# Patient Record
Sex: MALE | Race: WHITE | Employment: PART TIME | URBAN - METROPOLITAN AREA
[De-identification: names, ages, dates, MRNs, and addresses within clinical notes are randomized per-mention and may not be internally consistent; named-entity substitution may affect disease eponyms.]

---

## 2020-06-27 ENCOUNTER — HOSPITAL ENCOUNTER (EMERGENCY)
Facility: CLINIC | Age: 30
Discharge: STILL ADMITTED OR ADMINISTRATIVE | End: 2020-06-27
Attending: FAMILY MEDICINE | Admitting: FAMILY MEDICINE
Payer: OTHER GOVERNMENT

## 2020-06-27 ENCOUNTER — AMBULATORY - HEALTHEAST (OUTPATIENT)
Dept: BEHAVIORAL HEALTH | Facility: CLINIC | Age: 30
End: 2020-06-27

## 2020-06-27 VITALS
BODY MASS INDEX: 33.05 KG/M2 | WEIGHT: 243.7 LBS | HEART RATE: 63 BPM | TEMPERATURE: 97.7 F | OXYGEN SATURATION: 99 % | DIASTOLIC BLOOD PRESSURE: 74 MMHG | SYSTOLIC BLOOD PRESSURE: 138 MMHG | RESPIRATION RATE: 16 BRPM

## 2020-06-27 DIAGNOSIS — F32.A DEPRESSION, UNSPECIFIED DEPRESSION TYPE: ICD-10-CM

## 2020-06-27 DIAGNOSIS — R45.851 SUICIDAL IDEATION: ICD-10-CM

## 2020-06-27 LAB
AMPHETAMINES UR QL SCN: NEGATIVE
BARBITURATES UR QL: NEGATIVE
BENZODIAZ UR QL: NEGATIVE
CANNABINOIDS UR QL SCN: NEGATIVE
COCAINE UR QL: NEGATIVE
ETHANOL UR QL SCN: NEGATIVE
OPIATES UR QL SCN: NEGATIVE
SARS-COV-2 PCR COMMENT: NORMAL
SARS-COV-2 RNA SPEC QL NAA+PROBE: NEGATIVE
SARS-COV-2 RNA SPEC QL NAA+PROBE: NORMAL
SPECIMEN SOURCE: NORMAL
SPECIMEN SOURCE: NORMAL

## 2020-06-27 PROCEDURE — U0003 INFECTIOUS AGENT DETECTION BY NUCLEIC ACID (DNA OR RNA); SEVERE ACUTE RESPIRATORY SYNDROME CORONAVIRUS 2 (SARS-COV-2) (CORONAVIRUS DISEASE [COVID-19]), AMPLIFIED PROBE TECHNIQUE, MAKING USE OF HIGH THROUGHPUT TECHNOLOGIES AS DESCRIBED BY CMS-2020-01-R: HCPCS | Performed by: EMERGENCY MEDICINE

## 2020-06-27 PROCEDURE — 99285 EMERGENCY DEPT VISIT HI MDM: CPT | Mod: Z6 | Performed by: EMERGENCY MEDICINE

## 2020-06-27 PROCEDURE — C9803 HOPD COVID-19 SPEC COLLECT: HCPCS | Performed by: EMERGENCY MEDICINE

## 2020-06-27 PROCEDURE — 99285 EMERGENCY DEPT VISIT HI MDM: CPT | Mod: 25 | Performed by: EMERGENCY MEDICINE

## 2020-06-27 PROCEDURE — 90791 PSYCH DIAGNOSTIC EVALUATION: CPT

## 2020-06-27 PROCEDURE — 80320 DRUG SCREEN QUANTALCOHOLS: CPT | Performed by: EMERGENCY MEDICINE

## 2020-06-27 PROCEDURE — 80307 DRUG TEST PRSMV CHEM ANLYZR: CPT | Performed by: EMERGENCY MEDICINE

## 2020-06-27 RX ORDER — MULTIVITAMIN,THERAPEUTIC
1 TABLET ORAL DAILY
COMMUNITY

## 2020-06-27 SDOH — HEALTH STABILITY: MENTAL HEALTH: HOW OFTEN DO YOU HAVE A DRINK CONTAINING ALCOHOL?: NEVER
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
## 2020-06-27 NOTE — PHARMACY-ADMISSION MEDICATION HISTORY
Admission medication history interview status for the 6/27/2020 admission is complete. See Epic admission navigator for allergy information, pharmacy, prior to admission medications and immunization status.     Medication history interview sources:  Patient,    Changes made to PTA medication list (reason)  Added:   1.) Vitamin D PO  Deleted: N/A  Changed:   1.) Lysine PO. Changed to: Lysine PO: take 1 tablet by mouth daily.  2.) Multivatamin Adult PO. Changed to: multivitamin tablet: take 1 tablet by mouth daily.    Additional medication history information (including reliability of information, actions taken by pharmacist):  1.) Patient was a good historian; he knew his medication by name and when they were last taken.  2.) Patient states he does not take any prescription medications.  3.) Patient did not know the strength of any of his vitamins but he knows he takes 1 tablet of each daily.       Prior to Admission medications    Medication Sig Last Dose Taking? Auth Provider   LYSINE PO Take 1 tablet by mouth daily  6/27/2020 at AM Yes Reported, Patient   multivitamin, therapeutic (THERA-VIT) TABS tablet Take 1 tablet by mouth daily 6/27/2020 at AM Yes Unknown, Entered By History   vitamin B-Complex Take 1 tablet by mouth daily 6/27/2020 at AM Yes Reported, Patient   VITAMIN D PO Take 1 tablet by mouth daily 6/27/2020 at AM Yes Unknown, Entered By History         Medication history completed by: REID Shankar3 Pharmacy Intern

## 2020-06-27 NOTE — ED NOTES
I have performed an in person assessment of the patient. Based on this assessment the patient no longer requires a one on one attendant at this point in time.    Fab Pike MD  5:03 PM  June 27, 2020         Fab Pike MD  06/27/20 8585

## 2020-06-27 NOTE — ED NOTES
ED to Behavioral Floor Handoff    SITUATION  Kin Hickey is a 30 year old male who speaks English and lives in a home with others The patient arrived in the ED by private car from home with a complaint of Suicidal (Put a gun to his head 1 hour ago but didn't pull the trigger and went to talk with his friend and then came here. Has had SI before but no SA. Denies HI. )  .The patient's current symptoms started/worsened a few days ago and during this time the symptoms have increased.   In the ED, pt was diagnosed with   Final diagnoses:   Suicidal ideation   Depression, unspecified depression type        Initial vitals were: BP: (!) 148/73  Heart Rate: 86  Temp: 97.7  F (36.5  C)  Weight: 110.5 kg (243 lb 11.2 oz)  SpO2: 96 %   --------  Is the patient diabetic? No   If yes, last blood glucose? --     If yes, was this treated in the ED? --  --------  Is the patient inebriated (ETOH) No or Impaired on other substances? No  MSSA done? N/A  Last MSSA score: --    Were withdrawal symptoms treated? N/A  Does the patient have a seizure history? No. If yes, date of most recent seizure--  --------  Is the patient patient experiencing suicidal ideation? reports the following suicide factors: suicidal thoughts of shooting himself with a gun, held a gun to his head today    Homicidal ideation? denies current or recent homicidal ideation or behaviors.    Self-injurious behavior/urges? denies current or recent self injurious behavior or ideation.  ------  Was pt aggressive in the ED No  Was a code called No  Is the pt now cooperative? Yes  -------  Meds given in ED: Medications - No data to display   Family present during ED course? No  Family currently present? No    BACKGROUND  Does the patient have a cognitive impairment or developmental disability? No  Allergies: No Known Allergies.   Social demographics are   Social History     Socioeconomic History     Marital status: Single     Spouse name: None     Number of children:  None     Years of education: None     Highest education level: None   Occupational History     None   Social Needs     Financial resource strain: None     Food insecurity     Worry: None     Inability: None     Transportation needs     Medical: None     Non-medical: None   Tobacco Use     Smoking status: Never Smoker     Smokeless tobacco: Never Used   Substance and Sexual Activity     Alcohol use: Never     Frequency: Never     Drug use: Never     Sexual activity: Yes     Partners: Female   Lifestyle     Physical activity     Days per week: None     Minutes per session: None     Stress: None   Relationships     Social connections     Talks on phone: None     Gets together: None     Attends Scientologist service: None     Active member of club or organization: None     Attends meetings of clubs or organizations: None     Relationship status: None     Intimate partner violence     Fear of current or ex partner: None     Emotionally abused: None     Physically abused: None     Forced sexual activity: None   Other Topics Concern     None   Social History Narrative     None        ASSESSMENT  Labs results   Labs Ordered and Resulted from Time of ED Arrival Up to the Time of Departure from the ED   COVID-19 VIRUS (CORONAVIRUS) BY PCR   DRUG ABUSE SCREEN 6 CHEM DEP URINE ([facility])      Imaging Studies: No results found for this or any previous visit (from the past 24 hour(s)).   Most recent vital signs BP (!) 148/73   Temp 97.7  F (36.5  C)   Wt 110.5 kg (243 lb 11.2 oz)   SpO2 96%   BMI 33.05 kg/m     Abnormal labs/tests/findings requiring intervention:---   Pain control: pt had none  Nausea control: pt had none    RECOMMENDATION  Are any infection precautions needed (MRSA, VRE, etc.)? No If yes, what infection? --  ---  Does the patient have mobility issues? independently. If yes, what device does the pt use? ---  ---  Is patient on 72 hour hold or commitment? No If on 72 hour hold, have hold and rights been given to  patient? N/A  Are admitting orders written if after 10 p.m. ?N/A  Tasks needing to be completed:---     Jesica Nichols RN    [phone] [facility]   [phone] [facility]

## 2020-06-28 NOTE — ED NOTES
Patient transferred to [facility] via [facility] ambulance.   JOANNA Ochoa at receiving hospital updated on status.

## 2020-06-30 ASSESSMENT — ENCOUNTER SYMPTOMS
ABDOMINAL PAIN: 0
FEVER: 0
SHORTNESS OF BREATH: 0

## 2020-07-01 ENCOUNTER — COMMUNICATION - HEALTHEAST (OUTPATIENT)
Dept: FAMILY MEDICINE | Facility: CLINIC | Age: 30
End: 2020-07-01

## 2020-07-10 ENCOUNTER — OFFICE VISIT - HEALTHEAST (OUTPATIENT)
Dept: FAMILY MEDICINE | Facility: CLINIC | Age: 30
End: 2020-07-10

## 2020-07-10 DIAGNOSIS — F33.2 SEVERE RECURRENT MAJOR DEPRESSION WITHOUT PSYCHOTIC FEATURES (H): ICD-10-CM

## 2020-07-10 ASSESSMENT — ANXIETY QUESTIONNAIRES
5. BEING SO RESTLESS THAT IT IS HARD TO SIT STILL: NOT AT ALL
2. NOT BEING ABLE TO STOP OR CONTROL WORRYING: NOT AT ALL
1. FEELING NERVOUS, ANXIOUS, OR ON EDGE: NOT AT ALL
7. FEELING AFRAID AS IF SOMETHING AWFUL MIGHT HAPPEN: NOT AT ALL
6. BECOMING EASILY ANNOYED OR IRRITABLE: NOT AT ALL
3. WORRYING TOO MUCH ABOUT DIFFERENT THINGS: NOT AT ALL
4. TROUBLE RELAXING: SEVERAL DAYS
GAD7 TOTAL SCORE: 1

## 2020-07-10 ASSESSMENT — PATIENT HEALTH QUESTIONNAIRE - PHQ9: SUM OF ALL RESPONSES TO PHQ QUESTIONS 1-9: 6

## 2020-07-10 ASSESSMENT — MIFFLIN-ST. JEOR: SCORE: 2068.5

## 2020-07-20 ENCOUNTER — OFFICE VISIT - HEALTHEAST (OUTPATIENT)
Dept: BEHAVIORAL HEALTH | Facility: CLINIC | Age: 30
End: 2020-07-20

## 2020-07-20 ENCOUNTER — AMBULATORY - HEALTHEAST (OUTPATIENT)
Dept: BEHAVIORAL HEALTH | Facility: CLINIC | Age: 30
End: 2020-07-20

## 2020-07-20 DIAGNOSIS — F43.10 POSTTRAUMATIC STRESS DISORDER: ICD-10-CM

## 2020-07-20 DIAGNOSIS — F33.2 SEVERE RECURRENT MAJOR DEPRESSION WITHOUT PSYCHOTIC FEATURES (H): ICD-10-CM

## 2020-08-04 ENCOUNTER — OFFICE VISIT - HEALTHEAST (OUTPATIENT)
Dept: BEHAVIORAL HEALTH | Facility: CLINIC | Age: 30
End: 2020-08-04

## 2020-08-04 DIAGNOSIS — F43.23 ADJUSTMENT DISORDER WITH MIXED ANXIETY AND DEPRESSED MOOD: ICD-10-CM

## 2020-08-14 ENCOUNTER — OFFICE VISIT - HEALTHEAST (OUTPATIENT)
Dept: BEHAVIORAL HEALTH | Facility: CLINIC | Age: 30
End: 2020-08-14

## 2020-08-14 DIAGNOSIS — F43.23 ADJUSTMENT DISORDER WITH MIXED ANXIETY AND DEPRESSED MOOD: ICD-10-CM

## 2020-08-14 ASSESSMENT — ANXIETY QUESTIONNAIRES
7. FEELING AFRAID AS IF SOMETHING AWFUL MIGHT HAPPEN: NOT AT ALL
5. BEING SO RESTLESS THAT IT IS HARD TO SIT STILL: SEVERAL DAYS
2. NOT BEING ABLE TO STOP OR CONTROL WORRYING: SEVERAL DAYS
1. FEELING NERVOUS, ANXIOUS, OR ON EDGE: NOT AT ALL
6. BECOMING EASILY ANNOYED OR IRRITABLE: NEARLY EVERY DAY
3. WORRYING TOO MUCH ABOUT DIFFERENT THINGS: NOT AT ALL
GAD7 TOTAL SCORE: 5
IF YOU CHECKED OFF ANY PROBLEMS ON THIS QUESTIONNAIRE, HOW DIFFICULT HAVE THESE PROBLEMS MADE IT FOR YOU TO DO YOUR WORK, TAKE CARE OF THINGS AT HOME, OR GET ALONG WITH OTHER PEOPLE: NOT DIFFICULT AT ALL
4. TROUBLE RELAXING: NOT AT ALL

## 2020-08-14 ASSESSMENT — PATIENT HEALTH QUESTIONNAIRE - PHQ9: SUM OF ALL RESPONSES TO PHQ QUESTIONS 1-9: 9

## 2020-08-21 ENCOUNTER — OFFICE VISIT - HEALTHEAST (OUTPATIENT)
Dept: FAMILY MEDICINE | Facility: CLINIC | Age: 30
End: 2020-08-21

## 2020-08-21 ENCOUNTER — OFFICE VISIT - HEALTHEAST (OUTPATIENT)
Dept: BEHAVIORAL HEALTH | Facility: CLINIC | Age: 30
End: 2020-08-21

## 2020-08-21 DIAGNOSIS — F43.23 ADJUSTMENT DISORDER WITH MIXED ANXIETY AND DEPRESSED MOOD: ICD-10-CM

## 2020-08-21 DIAGNOSIS — F32.89 OTHER DEPRESSION: ICD-10-CM

## 2020-08-21 ASSESSMENT — ANXIETY QUESTIONNAIRES
1. FEELING NERVOUS, ANXIOUS, OR ON EDGE: NOT AT ALL
4. TROUBLE RELAXING: SEVERAL DAYS
3. WORRYING TOO MUCH ABOUT DIFFERENT THINGS: NOT AT ALL
7. FEELING AFRAID AS IF SOMETHING AWFUL MIGHT HAPPEN: NOT AT ALL
6. BECOMING EASILY ANNOYED OR IRRITABLE: NOT AT ALL
5. BEING SO RESTLESS THAT IT IS HARD TO SIT STILL: NOT AT ALL
GAD7 TOTAL SCORE: 1
2. NOT BEING ABLE TO STOP OR CONTROL WORRYING: NOT AT ALL

## 2020-08-21 ASSESSMENT — PATIENT HEALTH QUESTIONNAIRE - PHQ9: SUM OF ALL RESPONSES TO PHQ QUESTIONS 1-9: 3

## 2020-08-21 ASSESSMENT — MIFFLIN-ST. JEOR: SCORE: 2082.56

## 2020-08-28 ENCOUNTER — AMBULATORY - HEALTHEAST (OUTPATIENT)
Dept: BEHAVIORAL HEALTH | Facility: CLINIC | Age: 30
End: 2020-08-28

## 2020-08-28 ENCOUNTER — OFFICE VISIT - HEALTHEAST (OUTPATIENT)
Dept: BEHAVIORAL HEALTH | Facility: CLINIC | Age: 30
End: 2020-08-28

## 2020-08-28 DIAGNOSIS — F43.23 ADJUSTMENT DISORDER WITH MIXED ANXIETY AND DEPRESSED MOOD: ICD-10-CM

## 2020-09-04 ENCOUNTER — OFFICE VISIT - HEALTHEAST (OUTPATIENT)
Dept: BEHAVIORAL HEALTH | Facility: CLINIC | Age: 30
End: 2020-09-04

## 2020-09-04 DIAGNOSIS — F43.23 ADJUSTMENT DISORDER WITH MIXED ANXIETY AND DEPRESSED MOOD: ICD-10-CM

## 2020-09-25 ENCOUNTER — OFFICE VISIT - HEALTHEAST (OUTPATIENT)
Dept: BEHAVIORAL HEALTH | Facility: CLINIC | Age: 30
End: 2020-09-25

## 2020-09-25 DIAGNOSIS — F43.23 ADJUSTMENT DISORDER WITH MIXED ANXIETY AND DEPRESSED MOOD: ICD-10-CM

## 2020-10-06 ENCOUNTER — OFFICE VISIT - HEALTHEAST (OUTPATIENT)
Dept: BEHAVIORAL HEALTH | Facility: CLINIC | Age: 30
End: 2020-10-06

## 2020-10-06 DIAGNOSIS — F43.23 ADJUSTMENT DISORDER WITH MIXED ANXIETY AND DEPRESSED MOOD: ICD-10-CM

## 2020-10-28 ENCOUNTER — OFFICE VISIT - HEALTHEAST (OUTPATIENT)
Dept: BEHAVIORAL HEALTH | Facility: CLINIC | Age: 30
End: 2020-10-28

## 2020-10-28 DIAGNOSIS — F43.23 ADJUSTMENT DISORDER WITH MIXED ANXIETY AND DEPRESSED MOOD: ICD-10-CM

## 2020-10-28 ASSESSMENT — ANXIETY QUESTIONNAIRES
6. BECOMING EASILY ANNOYED OR IRRITABLE: NOT AT ALL
5. BEING SO RESTLESS THAT IT IS HARD TO SIT STILL: NOT AT ALL
4. TROUBLE RELAXING: NOT AT ALL
3. WORRYING TOO MUCH ABOUT DIFFERENT THINGS: SEVERAL DAYS
7. FEELING AFRAID AS IF SOMETHING AWFUL MIGHT HAPPEN: SEVERAL DAYS
2. NOT BEING ABLE TO STOP OR CONTROL WORRYING: SEVERAL DAYS
GAD7 TOTAL SCORE: 3
1. FEELING NERVOUS, ANXIOUS, OR ON EDGE: NOT AT ALL
IF YOU CHECKED OFF ANY PROBLEMS ON THIS QUESTIONNAIRE, HOW DIFFICULT HAVE THESE PROBLEMS MADE IT FOR YOU TO DO YOUR WORK, TAKE CARE OF THINGS AT HOME, OR GET ALONG WITH OTHER PEOPLE: NOT DIFFICULT AT ALL

## 2020-10-28 ASSESSMENT — PATIENT HEALTH QUESTIONNAIRE - PHQ9: SUM OF ALL RESPONSES TO PHQ QUESTIONS 1-9: 13

## 2020-11-06 ENCOUNTER — OFFICE VISIT - HEALTHEAST (OUTPATIENT)
Dept: BEHAVIORAL HEALTH | Facility: CLINIC | Age: 30
End: 2020-11-06

## 2020-11-06 DIAGNOSIS — F43.23 ADJUSTMENT DISORDER WITH MIXED ANXIETY AND DEPRESSED MOOD: ICD-10-CM

## 2020-11-30 ENCOUNTER — OFFICE VISIT - HEALTHEAST (OUTPATIENT)
Dept: BEHAVIORAL HEALTH | Facility: CLINIC | Age: 30
End: 2020-11-30

## 2020-11-30 ENCOUNTER — AMBULATORY - HEALTHEAST (OUTPATIENT)
Dept: BEHAVIORAL HEALTH | Facility: CLINIC | Age: 30
End: 2020-11-30

## 2020-11-30 DIAGNOSIS — F43.23 ADJUSTMENT DISORDER WITH MIXED ANXIETY AND DEPRESSED MOOD: ICD-10-CM

## 2020-11-30 ASSESSMENT — ANXIETY QUESTIONNAIRES
GAD7 TOTAL SCORE: 0
2. NOT BEING ABLE TO STOP OR CONTROL WORRYING: NOT AT ALL
6. BECOMING EASILY ANNOYED OR IRRITABLE: NOT AT ALL
4. TROUBLE RELAXING: NOT AT ALL
3. WORRYING TOO MUCH ABOUT DIFFERENT THINGS: NOT AT ALL
7. FEELING AFRAID AS IF SOMETHING AWFUL MIGHT HAPPEN: NOT AT ALL
1. FEELING NERVOUS, ANXIOUS, OR ON EDGE: NOT AT ALL
5. BEING SO RESTLESS THAT IT IS HARD TO SIT STILL: NOT AT ALL

## 2020-11-30 ASSESSMENT — PATIENT HEALTH QUESTIONNAIRE - PHQ9: SUM OF ALL RESPONSES TO PHQ QUESTIONS 1-9: 5

## 2020-12-07 ENCOUNTER — OFFICE VISIT - HEALTHEAST (OUTPATIENT)
Dept: BEHAVIORAL HEALTH | Facility: CLINIC | Age: 30
End: 2020-12-07

## 2020-12-07 DIAGNOSIS — F43.23 ADJUSTMENT DISORDER WITH MIXED ANXIETY AND DEPRESSED MOOD: ICD-10-CM

## 2020-12-21 ENCOUNTER — OFFICE VISIT - HEALTHEAST (OUTPATIENT)
Dept: BEHAVIORAL HEALTH | Facility: CLINIC | Age: 30
End: 2020-12-21

## 2020-12-21 DIAGNOSIS — F43.23 ADJUSTMENT DISORDER WITH MIXED ANXIETY AND DEPRESSED MOOD: ICD-10-CM

## 2021-01-11 ENCOUNTER — OFFICE VISIT - HEALTHEAST (OUTPATIENT)
Dept: BEHAVIORAL HEALTH | Facility: CLINIC | Age: 31
End: 2021-01-11

## 2021-01-11 DIAGNOSIS — F43.23 ADJUSTMENT DISORDER WITH MIXED ANXIETY AND DEPRESSED MOOD: ICD-10-CM

## 2021-02-03 ENCOUNTER — OFFICE VISIT - HEALTHEAST (OUTPATIENT)
Dept: BEHAVIORAL HEALTH | Facility: CLINIC | Age: 31
End: 2021-02-03

## 2021-02-03 DIAGNOSIS — F43.23 ADJUSTMENT DISORDER WITH MIXED ANXIETY AND DEPRESSED MOOD: ICD-10-CM

## 2021-02-23 ENCOUNTER — OFFICE VISIT - HEALTHEAST (OUTPATIENT)
Dept: BEHAVIORAL HEALTH | Facility: CLINIC | Age: 31
End: 2021-02-23

## 2021-02-23 DIAGNOSIS — F43.23 ADJUSTMENT DISORDER WITH MIXED ANXIETY AND DEPRESSED MOOD: ICD-10-CM

## 2021-04-06 ENCOUNTER — OFFICE VISIT - HEALTHEAST (OUTPATIENT)
Dept: BEHAVIORAL HEALTH | Facility: CLINIC | Age: 31
End: 2021-04-06

## 2021-05-26 ASSESSMENT — PATIENT HEALTH QUESTIONNAIRE - PHQ9
SUM OF ALL RESPONSES TO PHQ QUESTIONS 1-9: 9
SUM OF ALL RESPONSES TO PHQ QUESTIONS 1-9: 3

## 2021-05-27 ASSESSMENT — PATIENT HEALTH QUESTIONNAIRE - PHQ9
SUM OF ALL RESPONSES TO PHQ QUESTIONS 1-9: 5
SUM OF ALL RESPONSES TO PHQ QUESTIONS 1-9: 6
SUM OF ALL RESPONSES TO PHQ QUESTIONS 1-9: 13

## 2021-05-28 ASSESSMENT — ANXIETY QUESTIONNAIRES
GAD7 TOTAL SCORE: 0
GAD7 TOTAL SCORE: 1
GAD7 TOTAL SCORE: 3
GAD7 TOTAL SCORE: 1
GAD7 TOTAL SCORE: 5

## 2021-06-04 VITALS
HEIGHT: 71 IN | WEIGHT: 241.9 LBS | BODY MASS INDEX: 33.86 KG/M2 | HEART RATE: 68 BPM | SYSTOLIC BLOOD PRESSURE: 120 MMHG | RESPIRATION RATE: 16 BRPM | DIASTOLIC BLOOD PRESSURE: 74 MMHG

## 2021-06-04 VITALS
TEMPERATURE: 98.5 F | DIASTOLIC BLOOD PRESSURE: 74 MMHG | BODY MASS INDEX: 34.3 KG/M2 | HEART RATE: 62 BPM | HEIGHT: 71 IN | OXYGEN SATURATION: 98 % | SYSTOLIC BLOOD PRESSURE: 108 MMHG | WEIGHT: 245 LBS | RESPIRATION RATE: 18 BRPM

## 2021-06-09 NOTE — PROGRESS NOTES
Hospital Follow-up Visit:    Assessment/Plan:     1. Severe recurrent major depression without psychotic features       - buPROPion (WELLBUTRIN XL) 150 MG 24 hr tablet; Take 1 tablet (150 mg total) by mouth daily.  Dispense: 30 tablet; Refill: 3  - AMB REFERRAL TO MENTAL HEALTH AND ADDICTION  - Adult (18+); Assessment and Testing, Outpatient Treatment; General Psychological Testing; [facility]- Personality Testing ONLY [phone]; We will contact you to sche...   Follow up in 4 weeks for recheck.   ER if symptoms worsen during treatment. Make a verbal contract with a family member today to take you to the ER if you develop any suicidal thoughts and check in with this person daily about your symptoms.         Subjective:     Kin Hickey is a 30 y.o. male who presents for a hospital discharge follow up. He was seen after hospitalization for suicidal ideation. He voluntarily went to the ER and was admitted due to holding a gun to his head. He has surrendered his gun. He has started Wellbutrin 150 mg ER. He feels that this has helped since his discharge. He has not started psychiatric counseling. He has a history of depression since childhood and his father had Schizophrenia. He is reluctant to go on any medication in high dose due to seeing how these medications affected his father. He states that since starting Wellbutrin he has had a small rash on his arms, this completely resolved after 1 week and he denies shortness of breath, wheezing, dysphagia, throat swelling.   ROS: Patient denies fever, chills, sweats, fainting, fatigue, weight change, dizziness, sleep problems, chest pain, palpitations, shortness of breath, wheezing, cough,  sore throat, changes in hearing, ear pain,tinnitus,  disphagia, sore throat, globus, changes in vision, eye pain eye redness, acid reflux, nausea, vomiting, diarrhea, constipation, black or bloody stools,  Dysuria, frequency, urinary incontinence,  "nocturia, hematuria, back pain,joint pain, bone pain, muscle cramps,edema, weakness, numbness, tingling of extremities, rash, itching, skin changes, swollen lymph nodes, thirst, increased urination, breast lumps, breast pain, nipple discharge, memory difficulties, anxiety, mood swings, (female)vaginal discharge, dyspareunia, menorrhagia, pelvic pain, sexual dysfunction,   (male) testicular lumps, erectile dysfunction.      Hospital/Nursing Home/IP Rehab Facility: [facility]  Date of Admission: 6/27/20  Date of Discharge:7/1/20  Reason(s) for Admission: Suicidal depression            Do you have any problems taking your medication regularly?  None       Have you had any changes in your medication since discharge? None       Have you had any difficulty following your discharge or treatment plan?  No    Summary of hospitalization:  Hospital discharge summary reviewed  Diagnostic Tests/Treatments reviewed.  Follow up needed: None  Other Healthcare Providers Involved in Patient's Care: Patient Care Team:  Provider, No Primary Care as PCP - General      Update since discharge: {improved   Information from family, SNF, care coordination: Psychiatric referral     Post Discharge Medication Reconciliation: discharge medications reconciled, continue medications without change  Plan of care communicated with: patient    Objective:     Vitals:    07/10/20 1553   BP: 120/74   Pulse: 68   Resp: 16   Weight: (!) 241 lb 14.4 oz (109.7 kg)   Height: 5' 11.26\" (1.81 m)         Physical Exam:    HEENT: Sclera white, nares patent, MMM, no edema, no rashes, no jaw claudication.  Lungs: Clear to auscultation. No retractions, no increased work of respiration, equal chest rise.   Heart: Regular rate and rhythm, no murmurs, clicks,   Gallops.  Extremities: Upper body tremor.  Mood: Good eye contact, smiles easily, full affect, no pressured speech, no psychomotor agitation, no suicidal or homicidal ideations.  Little interest or " pleasure in doing things: Several days  Feeling down, depressed, or hopeless: Nearly every day  Trouble falling or staying asleep, or sleeping too much: Not at all  Feeling tired or having little energy: Not at all  Poor appetite or overeating: Several days  Feeling bad about yourself - or that you are a failure or have let yourself or your family down: Several days  Trouble concentrating on things, such as reading the newspaper or watching television: Not at all  Moving or speaking so slowly that other people could have noticed. Or the opposite - being so fidgety or restless that you have been moving around a lot more than usual: Not at all  Thoughts that you would be better off dead, or of hurting yourself in some way: Not at all  PHQ-9 Total Score: 6  Skin: no rashes, erythema, edema.    Coding guidelines for this visit:  Type of Medical   Decision Making Face-to-Face Visit       within 7 Days of discharge Face-to-Face Visit        within 14 days of discharge   Moderate Complexity [id number]   High Complexity [id number]       Electronically signed by Fidelina Noguera PA-C 07/10/20 4:22 PM
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
## 2021-06-09 NOTE — TELEPHONE ENCOUNTER
New Appointment Needed  What is the reason for the visit:    Inpatient/ED Follow Up Appt Request  At what hospital or facility were you seen?: [facility]  What is the reason you were seen?: Mental health   What date were you admitted?: date: 6/27/20  What date were you discharged?: date: 7/1/20  What was the recommended timeframe for your follow up appointment?: 7 days   Provider Preference: Any available  How soon do you need to be seen?: within a week   Waitlist offered?: No  Okay to leave a detailed message:  Yes

## 2021-06-10 NOTE — PROGRESS NOTES
HPI:  Kin Hickey is a 30 y.o. male who is seen for   Chief Complaint   Patient presents with     Follow-up     DEPRESSION ANXIETY AND STRESS    Kin Hickey follows up for derpression and anxiety. He states he could not tollerate Wellbutrin due to itching and rash and had to discontinue it . He would like to continue on counseling only and has been attending his sessions regularly. He is open to trying trazodone again since he had it before. He denies any thoughts of harm.   Little interest or pleasure in doing things: Not at all  Feeling down, depressed, or hopeless: More than half the days  Trouble falling or staying asleep, or sleeping too much: Not at all  Feeling tired or having little energy: Not at all  Poor appetite or overeating: Not at all  Feeling bad about yourself - or that you are a failure or have let yourself or your family down: Several days  Trouble concentrating on things, such as reading the newspaper or watching television: Not at all  Moving or speaking so slowly that other people could have noticed. Or the opposite - being so fidgety or restless that you have been moving around a lot more than usual: Not at all  Thoughts that you would be better off dead, or of hurting yourself in some way: Not at all  PHQ-9 Total Score: 3  Feeling nervous, anxious, or on edge: 0 (2020 11:00 AM)  Not being able to stop or control worryin (2020 11:00 AM)  Worrying too much about different things: 0 (2020 11:00 AM)  Trouble relaxin (2020 11:00 AM)  Being so restless that it's hard to sit still: 0 (2020 11:00 AM)  Becoming easily annoyed or irritable: 0 (2020 11:00 AM)  Feeling afraid as if something awful might happen: 0 (2020 11:00 AM)  ARMANDO 7 Total Score: 1 (2020 11:00 AM)        No results found for: HGBA1C  No results found for: GLUF, MICROALBUR, LDLCALC, CREATININE  Patient Active Problem List   Diagnosis     Recurrent Sore Throat     Acute Tonsillitis      Suicidal ideation     Severe recurrent major depression without psychotic features (H)     Posttraumatic stress disorder     Adjustment disorder with mixed anxiety and depressed mood     Family History   Problem Relation Age of Onset     Schizophrenia Father      Social History     Socioeconomic History     Marital status: Single     Spouse name: None     Number of children: None     Years of education: None     Highest education level: None   Occupational History     None   Social Needs     Financial resource strain: None     Food insecurity     Worry: None     Inability: None     Transportation needs     Medical: None     Non-medical: None   Tobacco Use     Smoking status: Never Smoker     Smokeless tobacco: Never Used     Tobacco comment: None   Substance and Sexual Activity     Alcohol use: Never     Frequency: Never     Drug use: Never     Comment: None     Sexual activity: Not Currently     Partners: Female     Birth control/protection: None   Lifestyle     Physical activity     Days per week: None     Minutes per session: None     Stress: None   Relationships     Social connections     Talks on phone: None     Gets together: None     Attends Jewish service: None     Active member of club or organization: None     Attends meetings of clubs or organizations: None     Relationship status: None     Intimate partner violence     Fear of current or ex partner: None     Emotionally abused: None     Physically abused: None     Forced sexual activity: None   Other Topics Concern     None   Social History Narrative     None     Past Surgical History:   Procedure Laterality Date     COSMETIC SURGERY  2008    Mastectomy     MASTECTOMY       Current Outpatient Medications on File Prior to Visit   Medication Sig Dispense Refill     buPROPion (WELLBUTRIN XL) 150 MG 24 hr tablet Take 1 tablet (150 mg total) by mouth daily. 30 tablet 3     cholecalciferol, vitamin D3, 1,000 unit (25 mcg) tablet Take 1,000 Units by  mouth daily.       cyanocobalamin, vitamin B-12, (CYANOCOBALAMIN) 1,000 mcg tablet Take 1,000 mcg by mouth daily.       lysine 500 mg Tab Take 500 mg by mouth daily.        melatonin 3 mg Tab tablet Take 1 tablet (3 mg total) by mouth at bedtime as needed.  0     multivitamin therapeutic tablet Take 1 tablet by mouth daily.       No current facility-administered medications on file prior to visit.      No Known Allergies    I have reviewed the patient's medical history in detail and updated the computerized patient record.  OBJECTIVE:  Wt Readings from Last 3 Encounters:   08/21/20 (!) 245 lb (111.1 kg)   07/10/20 (!) 241 lb 14.4 oz (109.7 kg)   06/28/20 (!) 242 lb (109.8 kg)     Temp Readings from Last 3 Encounters:   08/21/20 98.5  F (36.9  C) (Oral)   07/01/20 98  F (36.7  C) (Oral)     BP Readings from Last 3 Encounters:   08/21/20 108/74   07/10/20 120/74   07/01/20 114/58     Pulse Readings from Last 3 Encounters:   08/21/20 62   07/10/20 68   07/01/20 68     Body mass index is 33.92 kg/m .     General: alert, oriented and in no distress.  HEENT: Sclera white, PERRLA, EOMFI nares patent, MMM   Neck: neck supple with normal range of motion of the mandible and no dysarthria.  Lungs: Respirations are not labored. Equal chest rise.  Mood: Affect full, good eye contact, no pressured speech, no psychomotor agitation, no tremor, no suicidal or homicidal ideations.      Labs:  No visits with results within 3 Month(s) from this visit.   Latest known visit with results is:   No results found for any previous visit.     ASSESMENT/PLAN:  1. Other depression  traZODone (DESYREL) 50 MG tablet   willing to try trazodone for rescue. ER if symptoms worsen during treatment. Make a verbal contract with a family member today to take you to the ER if you develop any suicidal thoughts and check in with this person daily about your symptoms.  Continue counseling.  Use My chart and phone or video visit for rechecks over the next 6  weeks.  Fidelina Noguera, MS, PA-C 08/21/20

## 2021-06-10 NOTE — PROGRESS NOTES
Mental Health Visit Note    Patient: Kin Hickey    : 1990 MRN: [id number]    2020    Start time: 1:40 PM    Stop Time: 2:   Session # 2  Session was 53+ minutes in length due to content of the session and therapist needing to build rapport with client.     Session Type: Patient is presenting for an Individual session.    Kin Hickey is a 30 y.o. male is being seen today for adjustment disorder, with mixed anxiety and depressed mood.   .     Telemedicine Visit: The patient's condition can be safely assessed and treated via synchronous audio and visual telemedicine encounter.      Reason for Telemedicine Visit: Services only offered telehealth due to public health crisis    Originating Site (Patient Location): Patient's home    Distant Site (Provider Location): Provider Remote Setting- Home Office    Consent:  The patient/guardian has verbally consented to: the potential risks and benefits of telemedicine (video visit) versus in person care; bill my insurance or make self-payment for services provided; and responsibility for payment of non-covered services.     Mode of Communication:  Video Conference via [employer]    As the provider I attest to compliance with applicable laws and regulations related to telemedicine.    Those present for this visit: patient and provider    Follow up in regards to ongoing symptom management of: adjustment disorder related symptoms in response to a recent incident.     New symptoms or complaints: None    Functional Impairment:   Personal: 3  Family: 2  Social: 3  Work: 3    Clinical assessment of mental status:   Kin Hickey presented on time.   He was oriented x3, open and cooperative, and dressed appropriately for this session and weather. His memory was Normal cognitive functioning .  His speech was  Within normal.  Language was typical and appropriate.  Concentration and focus is Within normal. Psychosis is not noted or reported. He reports his mood is  "Congruent w/content of speech, Anxious and Depressed.  Affect is congruent with speech and is Congruent w/content of speech, Anxious and Depressed.  Fund of knowledge is adequate. Insight is adequate for therapy.    Suicidal/Homicidal Ideation present: Patient denies suicidal and homicidal ideations/means or plans.     Patient's impression of their current status: Patient says that his mood has been up and down over the last week. Patient reports that the support group he has been attending has been helpful for him and has helped him put things in perspective      Therapist impression of patients current state: This 30 y.o. White or  male presents with: depression, anxiety and trauma related symptoms. The patient's impression of his current status appears accurate. The patient appears highly motivated to learn how to manage his symptoms and to address his past behavior using a restorative justice approach, as evident by his statements and reported actions. The patient continues to appear anxious and depressed, as evident by his statements and observable behavior. However, the patient appears to be actively trying to manage his symptoms in \"healthy way.\" The patient continues to demonstrate good insight and appears to be an appropriate psychotherapy candidate.     Assessment tools used today include: Most recent ARMANDO-7, PHQ-9, Can be found documented in Doc Flowsheets.       Type of psychotherapeutic technique provided: Client centered and Rapport building, Psycho-education, Modeling    Progress toward short term goals:Progress as expected, new patient, goals not yet established    Review of long term goals: Not done at today's visit . new patient, goals not yet established  Diagnosis:   1. Adjustment disorder with mixed anxiety and depressed mood     no change    Plan and Follow-up: Patient plans to review \"unhealthy thinking styles sheet\" and start gratitude journaling. Patient plans to continue taking the " medication as prescribed. Patient plans to follow up with therapy a week. Provider will review recent hospitalization records and complete DA next session.    Discharge Criteria/Planning: Patient will continue with follow-up until therapy can be discontinued without return of signs and symptoms.    I have reviewed the note as documented above.  This accurately captures the substance of my conversation with the patient.    As the provider I attest to compliance with applicable laws and regulations related to telemedicine.  Performed and documented by CASI Ross   8/4/2020Performed and documented by 8/4/2020
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
## 2021-06-10 NOTE — PROGRESS NOTES
Mental Health Visit Note    Patient: Kin Hickey    : 1990 MRN: [id number]    2020    Start time: 1:04 PM    Stop Time: 2:01 PM   Session # 1    Session Type: Patient is presenting for an Individual session.    Kin Hickey is a 30 y.o. male is being seen today for    Chief Complaint   Patient presents with     Depression     PTSD     MH Follow Up   .     Telemedicine Visit: The patient's condition can be safely assessed and treated via synchronous audio and visual telemedicine encounter.      Reason for Telemedicine Visit: Services only offered telehealth    Originating Site (Patient Location): Patient's other : mother's house    Distant Site (Provider Location): Provider Remote Setting- Home Office    Consent:  The patient/guardian has verbally consented to: the potential risks and benefits of telemedicine (video visit) versus in person care; bill my insurance or make self-payment for services provided; and responsibility for payment of non-covered services.     Mode of Communication:  Video Conference via [email or web address]    As the provider I attest to compliance with applicable laws and regulations related to telemedicine.    Those present for this visit: patient and provider    Follow up in regards to ongoing symptom management of depression.     New symptoms or complaints: None    Functional Impairment:   Personal: 3  Family: 3  Social: 4  Work: 3    Clinical assessment of mental status:   Kin Hickey presented on time.   He was oriented x3, open and cooperative, and dressed appropriately for this session and weather. His memory was Normal cognitive functioning .  His speech was  Within normal.  Language was typical and appropriate.  Concentration and focus is Within normal. Psychosis is not noted or reported. He reports his mood is Congruent w/content of speech, Anxious and Depressed.  Affect is congruent with speech and is Congruent w/content of speech, Anxious and Depressed.  Fund of knowledge  is adequate. Insight is adequate for therapy.    ASSESSMENT: Current Emotional / Mental Status (status of significant symptoms):              Patient reports the following current or recent suicidal ideation or behaviors: Patient was recently hospitalized for SI. Patient was discharged from hospital and is currently staying with his mother while he stabilizes. .              Patient denies current or recent homicidal ideation or behaviors.              Patient denies current or recent self injurious behavior or ideation.              Patient denies other safety concerns.              A safety and risk management plan has been developed including: Patient consented to co-develop a safety plan.  Safety and risk management plan was completed. Patient agreed to use the safety plan should any safety concerns arise.  A copy of the completed safety plan will be sent to patient electronically.                Attitude:                                   Cooperative  Interested Attentive              Orientation:                             x3              Speech                          Rate / Production:       Normal/ Responsive Normal                           Volume:                       Normal               Mood:                                      Anxious  Depressed  Normal              Thought Content:                    Clear               Thought Form:                        Coherent  Logical               Insight:                                     Good  and Intellectual Insight    Patient's impression of their current status: Patient reports that he has been very depressed in response to a recent incident that occurred. The patient reports that he was recently hospitalized at [facility] for SI. Patient reports that he believes it would be good for him to participate in therapy so that he can learn how to communicate his feelings and be better equipt to deal with his emotions. Patient reports that he has been  dealing with shame associated with past trauma and behavior.     Therapist impression of patients current state: This 30 y.o. White or  male presents with symptoms of PTSD and Depression. The patient's impression of his current status appears accurate. Patient exhibited excellent insight and appears motivated to address his: mental health, past trauma, and past behavior, as evident by his statements and reported actions.     Assessment tools used today include: C-SSRS (Lifetime/Recent) and can be found documented in Doc Flowsheets. PHQ-9, ARMANDO-7, WHODAS and CAGE will be completed during the next 2 sessions.       Type of psychotherapeutic technique provided: Client centered and Psychoeducation, Rapport building      Progress toward short term goals:New patient - 1st session      Review of long term goals: New patient - First session .   Diagnosis:   1. Posttraumatic stress disorder    2. Severe recurrent major depression without psychotic features (H)        Plan and Follow-up:  Patient plans to continue taking the medication as prescribed. Patient plans to follow up with therapy two weeks. Providerwill continue to build rapport with patient. Provider will complete DA by 3rd session.       Discharge Criteria/Planning: Patient will continue with follow-up until therapy can be discontinued without return of signs and symptoms.      I have reviewed the note as documented above.  This accurately captures the substance of my conversation with the patient.    As the provider I attest to compliance with applicable laws and regulations related to telemedicine.  Performed and documented by: CASI Ross     7/20/2020Performed and documented by 7/20/2020

## 2021-06-10 NOTE — PROGRESS NOTES
"Step 1: Warning signs / cues (Thoughts, images, mood, situation, behavior) that a crisis may be developing: please check all that apply and/or add your own    Thoughts:   [] \"I don't matter\"   [x] \"People would be better off without me\"  [] \"I'm a burden\"  [x] \"I can't do this anymore\"  [x] \"I just want this to end\"   [] \"Nothing makes it better\"  [] \"Other\"     Images:   [x] Obsessive thoughts of death or dying:   [x] Flashbacks   [] Visions of harm  [] Other     Thinking Processes:   [x] Ruminations (can't stop thinking about my problems):   [] Racing thoughts   [x] Intrusive thoughts (bothersome, unwanted thoughts that come out of nowhere):   [x] Highly critical and negative thoughts:   [] Disorganized thinking:   [] Paranoia:   [] Depersonalization  [] Other     Mood:  [x] Worsening depression  [x] Hopelessness  [] Helplessness  [x] Intense anger  [x] Intense worry  [] Agitation  [] Disinhibited (not caring about things or consequences)   [] Mood swings  [] Other     Behaviors:   [x] Isolating/withdrawing   [] Using drugs,   [] Using alcohol  [] Can't stop crying  [x] Impulsive  [] Reckless behaviors (acting without thinking)  [] Giving things away  [] Saying good-bye  [] Aggression  [] Not taking care of myself   [] Not taking care of my responsibilities  [] Sleeping too much  [] Not sleeping enough  [] Increasing frequency and duration of dissociation  [] Other     Situations:   [] Bullying  [x] Loss  [x] Public shame  [] Legal issues  [] Anniversary of   [] Changes in symptoms   [] Physical Pain   [] Relationship problems  [x] Trauma   [] Relapse of   [x] Financial stress   [] Medical condition / diagnosis   [] Other     Step 2: Coping strategies  Things I can do to take my mind off of my problems without contacting another person    Distress Tolerance Strategies   [x] Relaxation activities  [] Arts and crafts:   [] Play with my pet   [] Listen to positive and upbeat music   [] Sensory based " activities/self-soothe with five senses  [] Watch a funny movie  [] [place]  [] Read a book  [x] Change body temperature (ice pack/cold water)  [x] Paced breathing/progressive muscle relaxation  [x] Intense exercise for 2-3 minutes; repeat  [] Other     Physical Activities:     [x] Go for a walk  [x] Exercise  [] Yoga  [] Gardening  [] Meditation  [x] Deep breathing   [x] Stretching  []  Other     Focus on helpful thoughts:   List thoughts you can remind yourself of that will help you to be safe.    - I am taking accountability for my actions.     -There are people in my life who love me.       Step 3: People that provide distraction: Friends/Co-workers at the gym.     Name: Mom  Phone: Number in patient's phone    Name: Friends/Co-workers at the gym.   Phone: Numbers are in my phone    Name: Roomates  Phone: Numbers in phone    Social settings that provide distraction  [] Movie theater  [] Pet store/[employer] society  [] Zoo  [] Coffee shop  [] Park  [] Library  [] Volunteering  [] Community center  [x] Gym  [] Anabaptism  [] Support group (i.e. twelve-step)  [x] School and/or work  []  Other     Step 4: Remind myself of people that are important to me and worth living for.     Remind myself of things/pets/beliefs that are important to me and worth living for.    Step 5: When I am in crisis, I can ask these people to help me use my safety plan:    Name: Mom  Phone: Number in my phone    Name: Roomates  Phone:Numbers are in my phone.         Step 6: Making the environment safe:    [] Remove alcohol  [] Remove drugs  [x] Secure medications  [x] Dispose of old medications  [] Remove access to firearms  [x] Remove things I could use to hurt myself  [] Take alternate routes from my usual routine  []  Arrange transportation rather than drive myself  [x] Spend time with / be around others  [] Other     Step 7: Professionals or agencies I can contact during a crisis:    [x] [facility] Daytime Number:  [phone]  [x] Suicide Prevention Lifeline: 0-732-355-TALK (9422)  [x] Crisis Text Line Service (available 24 hours a day, 7 days a week):   [x] Text [place] to 502717   [x] Local Crisis Services   [x] Call 911 or go to my nearest emergency department.    I helped develop this safety plan and agree to use it when needed. I have been given a copy of this plan.    Client signature _________________________________________________________________    Today s date: 7/20/2020      Adapted from Safety Plan Template 2008 Moon Marquez and Blaze Kimble is reprinted with the express permission of the authors. No portion of the Safety Plan Template may be reproduced without the express, written permission. You can contact the authors at [email or web address] or [email or web address].

## 2021-06-10 NOTE — PATIENT INSTRUCTIONS - HE
"Safety Plan:  Step 1: Warning signs / cues (Thoughts, images, mood, situation, behavior) that a crisis may be developing: please check all that apply and/or add your own     Thoughts:   []? \"I don't matter\"   [x]? \"People would be better off without me\"  []? \"I'm a burden\"  [x]? \"I can't do this anymore\"  [x]? \"I just want this to end\"   []? \"Nothing makes it better\"  []? \"Other\"      Images:   [x]? Obsessive thoughts of death or dying:   [x]? Flashbacks   []? Visions of harm  []? Other      Thinking Processes:   [x]? Ruminations (can't stop thinking about my problems):   []? Racing thoughts   [x]? Intrusive thoughts (bothersome, unwanted thoughts that come out of nowhere):   [x]? Highly critical and negative thoughts:   []? Disorganized thinking:   []? Paranoia:   []? Depersonalization  []? Other      Mood:  [x]? Worsening depression  [x]? Hopelessness  []? Helplessness  [x]? Intense anger  [x]? Intense worry  []? Agitation  []? Disinhibited (not caring about things or consequences)   []? Mood swings  []? Other      Behaviors:   [x]? Isolating/withdrawing   []? Using drugs,   []? Using alcohol  []? Can't stop crying  [x]? Impulsive  []? Reckless behaviors (acting without thinking)  []? Giving things away  []? Saying good-bye  []? Aggression  []? Not taking care of myself   []? Not taking care of my responsibilities  []? Sleeping too much  []? Not sleeping enough  []? Increasing frequency and duration of dissociation  []? Other      Situations:   []? Bullying  [x]? Loss  [x]? Public shame  []? Legal issues  []? Anniversary of   []? Changes in symptoms   []? Physical Pain   []? Relationship problems  [x]? Trauma   []? Relapse of   [x]? Financial stress   []? Medical condition / diagnosis   []? Other      Step 2: Coping strategies  Things I can do to take my mind off of my problems without contacting another person     Distress Tolerance Strategies   [x]? Relaxation activities  []? Arts and crafts:   []? Play with my " pet   []? Listen to positive and upbeat music   []? Sensory based activities/self-soothe with five senses  []? Watch a funny movie  []? [place]  []? Read a book  [x]? Change body temperature (ice pack/cold water)  [x]? Paced breathing/progressive muscle relaxation  [x]? Intense exercise for 2-3 minutes; repeat  []? Other      Physical Activities:               [x]? Go for a walk  [x]? Exercise  []? Yoga  []? Gardening  []? Meditation  [x]? Deep breathing   [x]? Stretching  []?  Other      Focus on helpful thoughts:   List thoughts you can remind yourself of that will help you to be safe.     - I am taking accountability for my actions.      -There are people in my life who love me.         Step 3: People that provide distraction: Friends/Co-workers at the gym.      Name: Mom  Phone: Number in patient's phone     Name: Friends/Co-workers at the gym.   Phone: Numbers are in my phone     Name: Roomates  Phone: Numbers in phone     Social settings that provide distraction  []? Movie theater  []? Pet store/humane society  []? Zoo  []? Coffee shop  []? Park  []? Library  []? Volunteering  []? Community center  [x]? Gym  []? Anabaptist  []? Support group (i.e. twelve-step)  [x]? School and/or work  []?  Other      Step 4: Remind myself of people that are important to me and worth living for.                Remind myself of things/pets/beliefs that are important to me and worth living for.     Step 5: When I am in crisis, I can ask these people to help me use my safety plan:     Name: Mom  Phone: Number in my phone     Name: Jocelyne  Phone:Numbers are in my phone.            Step 6: Making the environment safe:     []? Remove alcohol  []? Remove drugs  [x]? Secure medications  [x]? Dispose of old medications  []? Remove access to firearms  [x]? Remove things I could use to hurt myself  []? Take alternate routes from my usual routine  []?  Arrange transportation rather than drive myself  [x]? Spend time with / be around  others  []? Other      Step 7: Professionals or agencies I can contact during a crisis:     [x]? [facility] Daytime Number: [phone]  [x]? Suicide Prevention Lifeline: 1-390-638-PAHU (5842)  [x]? Crisis Text Line Service (available 24 hours a day, 7 days a week):   [x]? Text [place] to 405104              [x]? Local Crisis Services              [x]? Call 911 or go to my nearest emergency department.     I helped develop this safety plan and agree to use it when needed. I have been given a copy of this plan.     Client signature _________________________________________________________________     Today s date: 7/20/2020

## 2021-06-11 NOTE — PROGRESS NOTES
Mental Health Visit Note    Patient: Kin Hickey    : 1990 MRN: [id number]    2020    Start time: 2:07 PM  Stop Time: 2:52 PM  Session #4    Session Type: Patient is presenting for an Individual session.    Kin Hickey is a 30 y.o. male is being seen today for depression and PTSD related symptoms.     Telemedicine Visit: The patient's condition can be safely assessed and treated via synchronous audio and visual telemedicine encounter.      Reason for Telemedicine Visit: Services only offered telehealth due to public health crisis      Originating Site (Patient Location): Patient's home    Distant Site (Provider Location): Provider Remote Setting- Home Office    Consent:  The patient/guardian has verbally consented to: the potential risks and benefits of telemedicine (video visit) versus in person care; bill my insurance or make self-payment for services provided; and responsibility for payment of non-covered services.     Mode of Communication:  Video Conference via [employer]    As the provider I attest to compliance with applicable laws and regulations related to telemedicine.    Those present for this visit: Provider and Patient    Follow up in regards to ongoing symptom management of depression and anxiety related symptoms.     New symptoms or complaints: None    Functional Impairment:   Personal: 4  Family: 2  Social: 4  Work: 2    Clinical assessment of mental status:   Kin Hickey presented on time.   He was oriented x3, open and cooperative, and dressed appropriately for this session and weather. His memory was Normal cognitive functioning .  His speech was  Within normal.  Language was typical and appropriate.  Concentration and focus is Within normal. Psychosis is not noted or reported. He reports his mood is Congruent w/content of speech, Anxious and Depressed.  Affect is congruent with speech and is Congruent w/content of speech, Anxious and Depressed.  Fund of knowledge is  adequate. Insight is adequate for therapy.    ASSESSMENT: Current Emotional / Mental Status (status of significant symptoms):              Patient denies current or recent suicidal ideation or behaviors.              Patient denies current or recent homicidal ideation or behaviors.              Patient denies current or recent self injurious behavior or ideation.              Patient denies other safety concerns.                A safety and risk management plan has been developed including: was developed at a previous session.                 Attitude:                                   Cooperative  Interested Attentive              Orientation:                             x3              Speech                          Rate / Production:       Normal/ Responsive Normal                           Volume:                       Normal               Mood:                                      Depressed  Normal              Thought Content:                    Clear               Thought Form:                        Coherent  Logical               Insight:                                     Good  and Intellectual Insight    Patient's impression of their current status: Patient reports that it has been a pretty good week. The patient reports that he feels about about a recent conversation he had. Patient also reports feeling frustrated about his current living situation. Patient reports that he has been trying to think positively.     Therapist impression of patients current state: This 30 y.o. male presents with depression related symptoms. The patient's impression of his current state appears accurate. The patient continues to appear depressed but appears more hopeful than he has in previous sessions, as evident by his statements, manner of speaking and body language. The patient exhibited good insight throughout the session and appeared receptive to the feedback and information that the therapist provided, as evident by  his statements and body language.     Assessment tools used today include: None completed this session.     Type of psychotherapeutic technique provided: Insight oriented, Client centered and Active listening, Validation, Normalization      Progress toward short term goals:Progress as expected, patient reports that he spoke to his roomates and reports that he has been continuing to exercise       Review of long term goals: Not done at today's visit . Will be established next visit.        Diagnosis: adjustment disorder, with mixed anxiety and depressed mood, improving      Plan and Follow-up: Patient plans to move back into his apartment. Patient plans to think of a meaningful way he can release and let go. Patient plans to continue exercising. Patient plans to continue taking the medication as prescribed. Patient plans to follow up with therapy a week.     Establish long-term goals next session.       Discharge Criteria/Planning: Patient will continue with follow-up until therapy can be discontinued without return of signs and symptoms.      I have reviewed the note as documented above.  This accurately captures the substance of my conversation with the patient.    As the provider I attest to compliance with applicable laws and regulations related to telemedicine.  Performed and documented by CASI Ross   8/28/2020Performed and documented by 8/28/2020

## 2021-06-11 NOTE — PROGRESS NOTES
"Outpatient Mental Health Treatment Plan    Name:  Kin Hickey  :  1990  MRN:  [id number]    Treatment Plan:  Initial Treatment Plan  Date Treatment Plan Initiated:  2020  Treatment Plan: Updated Treatment Plan R  Intake/initial treatment plan date: 2020  Benefit and risks and alternatives have been discussed: Yes    Plan:      ? Depression    Goal:  Decrease average depression level from 4 to 1 or 2.   Strategies:    ?[x] Decrease social isolation     [x] Increase involvement in meaningful activities     ?[] Discuss sleep hygiene     ?[x] Explore thoughts and expectations about self and others     ?[x] Process grief (loss of significant person, independence, role, etc.)     ?[x] Assess for suicide risk     ?[] Implement physical activity routine (with physician approval)     [x] Consider introduction of bibliotherapy and/or videos     [x] Continue compliance with medical treatment plan (or explore barriers)       ?    Degree to which this is a problem (1-4): 4  Degree to which goal is met (1-4): N/A Initial Tx Plan  Date of Review: Will be reviewed/updated in 90 days    ? Other:   Goal: Improve communication skills and interpersonal relationships.   Strategies:       ?[x] Learn assertive/effective communication techniques     [x] Learn about \"healthy\" vs. \"unhealthy\" relationships     ?[x] Identify, establish and maintain healthy boundaries     ?[x] Explore thoughts and expectations about self and others      ?  Degree to which this is a problem (1-4): 3  Degree to which goal is met (1-4): N/A -Initial treatment plan  Date of Review: Will be updated/reviewed in 90 days    Functional Impairment: 1=Not at all/Rarely  2=Some days  3=Most Days  4=Every Day   Personal: 4  Family: 2  Social: 3  Work: 2    Diagnosis:      Strengths:  Patient is: intelligent, insightful, self-aware, motivated, creative, considerate and resourceful. Patient has a stable support system.   Limitations: Patient has a small " support system and limited financial means.   Cultural Considerations: Patient reports that he has a direct communication style. Patient has eclectic spiritual beliefs and is from a multicultural background.   Family involvement No, Patient does not want to involve his family members at this time. However, the patient is willing to involve them in his care if necessary.     Anticipated intensity of services:  Every 1-2 weeks    Estimated duration of treatment:  24 sessions  Necessity for frequency: This frequency is needed to establish therapeutic goals and for continuity of care in order to monitor progress.   Necessity for treatment: To address cognitive, behavioral, and/or emotional barriers in order to work toward goals and to improve quality of life.   Is this treatment appropriate with minimal intrusion/restrictions: Yes    Persons responsible for this plan:  ? [x] Patient ? [x] Provider ? [] Other: __________________      Provider: Amelia Newby  Date:  8/28/2020

## 2021-06-11 NOTE — PROGRESS NOTES
Mental Health Visit Note    Patient: Kin Hickey    : 1990 MRN: [id number]    2020    Start time: 1:03 PM    Stop Time: 1:49 PM   Session # 6    Session Type: Patient is presenting for an Individual session.    Kin Hickey is a 30 y.o. male is being seen today for: Adjustment disorder with mixed anxiety and depressed mood      Telemedicine Visit: The patient's condition can be safely assessed and treated via synchronous audio and visual telemedicine encounter.      Reason for Telemedicine Visit: Services only offered telehealth    Originating Site (Patient Location): Patient's home    Distant Site (Provider Location): Provider Remote Setting- Home Office    Consent:  The patient/guardian has verbally consented to: the potential risks and benefits of telemedicine (video visit) versus in person care; bill my insurance or make self-payment for services provided; and responsibility for payment of non-covered services.     Mode of Communication:  Video Conference via [employer]    As the provider I attest to compliance with applicable laws and regulations related to telemedicine.    Those present for this visit: Patient and Provider     Follow up in regards to ongoing symptom management of: Depression related symptoms in response to an identifiable stressor.     New symptoms or complaints: None    Functional Impairment:   Personal: 4  Family: 2  Social: 3  Work: 2    Clinical assessment of mental status:   Kin Hickey presented on time.   He was oriented x3, open and cooperative, and dressed appropriately for this session and weather. His memory was Normal cognitive functioning .  His speech was  Within normal.  Language was typical and appropriate.  Concentration and focus is Within normal. Psychosis is not noted or reported. He reports his mood is Congruent w/content of speech and Depressed.  Affect is congruent with speech and is Congruent w/content of speech and Depressed.  Fund of knowledge is  "adequate. Insight is adequate for therapy.    ASSESSMENT: Current Emotional / Mental Status (status of significant symptoms):                            Patient denies current or recent suicidal ideation or behaviors.              Patient denies current or recent homicidal ideation or behaviors.              Patient denies current or recent self injurious behavior or ideation.              Patient denies other safety concerns.                          Recommended that patient call 911 or go to the local ED should there be a change in any of these risk factors.                Attitude:                                   Cooperative  Interested Friendly Pleasant Attentive              Orientation:                               All: Person, Place, Time and Situation                Speech                          Rate / Production:       Normal/ Responsive Normal                           Volume:                       Normal               Mood:                                      Depressed  Normal              Thought Content:                    Clear               Thought Form:                        Coherent  Logical               Insight:                                     Good  and Intellectual Insight    Patient's impression of their current status: The patient reports that he has been \"doing alright\". Patient reports that he has been \"feeling kind of numb lately\". The patient reports that he has \"been working on communicating his feelings and thoughts with other people\".     Therapist impression of patients current state: This 30 y.o. White or  male presents with depression related symptoms in response to an identifiable stressor. The patient demonstrated good self-awareness and insight during the session. The patient appears motivated to improve his communication and interpersonal skills, as evident by his statements and reported actions.     Assessment tools used today include: None    Type of " "psychotherapeutic technique provided: Insight oriented, Client centered, CBT and Active listening, Validation, Normalization, Psychoeducation      Progress toward short term goals:Progress as expected, patient reports that he came up with a plan to \"let go\" in a meaningful.symbolic way. Patient also reports that he has been working on communicating his thoughts and feelings with others.       Review of long term goals: Not done at today's visit .   Diagnosis:   1. Adjustment disorder with mixed anxiety and depressed mood     improving,      Plan and Follow-up: Patient plans to write thoughts and feelings related to an identifiable stressor then then symbolically \"Let it go\". Patient plans to continue taking the medication as prescribed. Patient plans to follow up with therapy in 1 week.      Discharge Criteria/Planning: Patient will continue with follow-up until therapy can be discontinued without return of signs and symptoms.      I have reviewed the note as documented above.  This accurately captures the substance of my conversation with the patient.    As the provider I attest to compliance with applicable laws and regulations related to telemedicine.  Performed and documented by CASI Ross   9/4/2020Performed and documented by 9/4/2020    "

## 2021-06-11 NOTE — PROGRESS NOTES
Mental Health Visit Note    Patient: Kin Hickey    : 1990 MRN: [id number]    2020    Start time: 10:01 AM    Stop Time: 10:41 AM Session # 4    Session Type: Patient is presenting for an Individual session.    Kin Hickey is a 30 y.o. male is being seen today for    Chief Complaint   Patient presents with     MH Follow Up     Adjustment disorder   .     Telemedicine Visit: The patient's condition can be safely assessed and treated via synchronous audio and visual telemedicine encounter.      Reason for Telemedicine Visit: Services only offered telehealth    Originating Site (Patient Location): Patient's other :patient was in his car in the parking lot of a [facility]    Distant Site (Provider Location): Provider Remote Setting- Home Office    Consent:  The patient/guardian has verbally consented to: the potential risks and benefits of telemedicine (video visit) versus in person care; bill my insurance or make self-payment for services provided; and responsibility for payment of non-covered services.     Mode of Communication:  Video Conference via [employer]    As the provider I attest to compliance with applicable laws and regulations related to telemedicine.    Those present for this visit: Provider and Patient    Follow up in regards to ongoing symptom management of Adjustment disorder, with mixed anxiety and depressed mood    New symptoms or complaints: None    Functional Impairment:   Personal: 3  Family: 2  Social: 3  Work: 2    Clinical assessment of mental status:   Kin Hickey presented on time.   He was oriented x3, open and cooperative, and dressed appropriately for this session and weather. His memory was Normal cognitive functioning .  His speech was  Within normal.  Language was typical and appropriate.  Concentration and focus is Within normal. Psychosis is not noted or reported. He reports his mood is Congruent w/content of speech and Depressed.  Affect is  congruent with speech and is Congruent w/content of speech and Depressed.  Fund of knowledge is adequate. Insight is adequate for therapy.    ASSESSMENT: Current Emotional / Mental Status (status of significant symptoms):                     Patient denies current or recent suicidal ideation or behaviors.              Patient denies current or recent homicidal ideation or behaviors.              Patient denies current or recent self injurious behavior or ideation.              Patient denies other safety concerns.                          A safety and risk management plan has been developed including: Patient consented to co-develop a safety plan.  Safety and risk management plan was completed. Patient agreed to use the safety plan should any safety concerns arise.  A copy of the completed safety plan was given to the patient.                Attitude:                                   Cooperative  Interested Friendly Pleasant Attentive              Orientation:                             All              Speech                          Rate / Production:       Normal/ Responsive Normal                           Volume:                       Normal               Mood:                                      Depressed  Normal              Thought Content:                    Clear               Thought Form:                        Coherent  Logical               Insight:                                     Good  and Intellectual Insight    Patient's impression of their current status: The patient reports that he continues to feel depressed or numb. The patient reports that he has been trying to focus on what he can control and to be respectful of other people's boundaries.     Therapist impression of patients current state: This 30 y.o. White or  male presents with depression related symptoms in response to an identifiable stressor. The patient appears goal orientated and motivated to make positive changes in  his life, as evident by his statements and reported actions. The patient appears to be focused on being physically and emotionally healthy, as evident by his statements and reported actions. The patient appeared receptive to the feedback and information that the therapist provided, as evident by his responses and body language.     Assessment tools used today include: None    Type of psychotherapeutic technique provided: Insight oriented, Client centered and Rapport building, Active Listening, Validation, Normalization      Progress toward short term goals:Progress as expected, patient has been exercising and taking his medications as prescribed      Review of long term goals: Not done at today's visit . Will be established at the next visit.       Diagnosis:   1. Adjustment disorder with mixed anxiety and depressed mood    improving      Plan and Follow-up: Patient plans to continue to work out regulalry. Patient plans to continue taking the medication as prescribed. Patient plans to follow up with therapy in a week.      Discharge Criteria/Planning: Patient will continue with follow-up until therapy can be discontinued without return of signs and symptoms.      I have reviewed the note as documented above.  This accurately captures the substance of my conversation with the patient.    As the provider I attest to compliance with applicable laws and regulations related to telemedicine.  Performed and documented by CASI Ross     8/21/2020 Performed and documented by 8/21/2020

## 2021-06-12 NOTE — PROGRESS NOTES
Mental Health Psychotherapy Note    Patient: Kin Hickey    : 1990 MRN: [id number]    10/28/2020    Start time: 3:36 PM    Stop Time: 4:33 PM  Session # 9    Session was 53+ min in length due to content of session and assessment tools    Session Type: Patient is presenting for an Individual session.    Kin Hickey is a 30 y.o. male is being seen today for    Chief Complaint   Patient presents with     MH Follow Up   .     Telemedicine Visit: The patient's condition can be safely assessed and treated via synchronous audio and visual telemedicine encounter.      Reason for Telemedicine Visit: Patient has requested telehealth visit    Originating Site (Patient Location): Patient's home    Distant Site (Provider Location): Provider Remote Setting- Home Office    Consent:  The patient/guardian has verbally consented to: the potential risks and benefits of telemedicine (video visit) versus in person care; bill my insurance or make self-payment for services provided; and responsibility for payment of non-covered services.     Mode of Communication:  Video Conference via [employer]    As the provider I attest to compliance with applicable laws and regulations related to telemedicine.    Those present for this visit: Patient and Provider    Follow up in regards to ongoing symptom management of depression related symptoms in response to an identifiable stressor.     New symptoms or complaints: None    Functional Impairment:   Personal: 3  Family: 2  Social: 2  Work: 2    Clinical assessment of mental status:   Kin Hickey presented on time.   He was oriented x3, open and cooperative, and dressed appropriately for this session and weather. His memory was Normal cognitive functioning .  His speech was  Within normal.  Language was typical and appropriate.  Concentration and focus is Within normal. Psychosis is not noted or reported. He reports his mood is Congruent w/content of speech and Depressed.  Affect is  "congruent with speech and is Congruent w/content of speech and Depressed.  Fund of knowledge is adequate. Insight is adequate for therapy.    ASSESSMENT: Current Emotional / Mental Status (status of significant symptoms):                           Patient reports the following current or recent suicidal ideation or behaviors: Patient reported having passive SI. Patient reports that SIs were fleeting and manageable. Patient denies having a plan or intent. Patient verbally contracted for safety and agreed to follow his safety plan. .              Patient denies current or recent homicidal ideation or behaviors.              Patient denies current or recent self injurious behavior or ideation.              Patient denies other safety concerns.                         A safety and risk management plan has been developed including: Patient consented to co-develop a safety plan.  Safety and risk management plan was completed. Patient agreed to use the safety plan should any safety concerns arise.  A copy of the completed safety plan was given to the patient.                Attitude:                                   Cooperative  Interested Friendly Pleasant Attentive              Orientation:                               All: Person, Place, Time and Situation                Speech                          Rate / Production:       Normal/ Responsive Normal                           Volume:                       Normal               Mood:                                      Depressed  Normal              Thought Content:                    Clear               Thought Form:                        Coherent  Goal Directed  Logical               Insight:                                     Good  and Intellectual Insight    Patient's impression of their current status: Patient reports that he was feeling \"kind of tired\". Patient reports that he has been waking up frequently throughout the night. The patient reports that he has " "been feeling a little more \"down lately\" which he attributes to: current life circumstances, current events, and the change in seasons.     Therapist impression of patients current state: This 30 y.o. White or  male presents with depression related symptoms in response to an identifiable stressor. The patient appeared tired and more depressed than he has in recent sessions, as evident by his statements, body language, and responses on the PHQ-9. The patient appears to be practicing healthy coping strategies, as evident by his statements and reported actions.     Assessment tools used today include:ARMANDO-7, PHQ-9, and can be found documented in Doc Flowsheets.       Type of psychotherapeutic technique provided: Insight oriented, Client centered and Active listening, Validation, Normalizaiton      Progress toward short term goals:Progress as expected, Patient reports that he has been practicing being open and honest about his feelings  And continuing to exercise regularly.       Review of long term goals: Not done at today's visit . Date of last review 8/28/2020.       Diagnosis:   1. Adjustment disorder with mixed anxiety and depressed mood    worsening slightly - depression symptoms increased      Plan and Follow-up: Patient plans to: play more music, continue exercising , explore career advancement opportunities, and decrease caffeine intake. . Patient plans to follow up with therapy 11/6/2020.      Discharge Criteria/Planning: Patient will continue with follow-up until therapy can be discontinued without return of signs and symptoms.      I have reviewed the note as documented above.  This accurately captures the substance of my conversation with the patient.    As the provider I attest to compliance with applicable laws and regulations related to telemedicine.  Performed and documented by CASI Ross   10/28/2020Performed and documented by 10/28/2020    "

## 2021-06-12 NOTE — PROGRESS NOTES
Mental Health Psychotherapy Note    Patient: Kin Hickey    : 1990 MRN: [id number]    2020    Start time: 1:39 PM    Stop Time: 2:28 PM   Session # 10    Session Type: Patient is presenting for an Individual session.    Kin Hickey is a 30 y.o. male is being seen today for: Adjustment disorder with mixed anxiety and depressed mood  .     Telemedicine Visit: The patient's condition can be safely assessed and treated via synchronous audio and visual telemedicine encounter.      Reason for Telemedicine Visit: Services only offered telehealth    Originating Site (Patient Location): Patient's home    Distant Site (Provider Location): Provider Remote Setting- Home Office    Consent:  The patient/guardian has verbally consented to: the potential risks and benefits of telemedicine (video visit) versus in person care; bill my insurance or make self-payment for services provided; and responsibility for payment of non-covered services.     Mode of Communication:  Video Conference via [employer]    As the provider I attest to compliance with applicable laws and regulations related to telemedicine.    Those present for this visit: Patient and Provider    Follow up in regards to ongoing symptom management of depression related symptoms    New symptoms or complaints: None    Functional Impairment:   Personal: 4  Family: 2  Social: 2  Work: 2    Clinical assessment of mental status:   Kin Hickey presented on time.   He was oriented x3, open and cooperative, and dressed appropriately for this session and weather. His memory was Normal cognitive functioning .  His speech was  Within normal.  Language was typical and appropriate.  Concentration and focus is Within normal. Psychosis is not noted or reported. He reports his mood is Depressed.  Affect is congruent with speech and is Congruent w/content of speech and Depressed.  Fund of knowledge is adequate. Insight is adequate for therapy.    ASSESSMENT: Current  "Emotional / Mental Status (status of significant symptoms):                            Patient denies current or recent suicidal ideation or behaviors.              Patient denies current or recent homicidal ideation or behaviors.              Patient denies current or recent self injurious behavior or ideation.              Patient denies other safety concerns.                          Recommended that patient call 911 or go to the local ED should there be a change in any of these risk factors.                Attitude:                                   Cooperative  Interested Friendly Pleasant Attentive              Orientation:                               All: Person, Place, Time and Situation                Speech                          Rate / Production:       Normal/ Responsive Normal                           Volume:                       Normal               Mood:                                      Depressed  Normal              Thought Content:                    Clear               Thought Form:                        Coherent  Goal Directed  Logical               Insight:                                     Good  and Intellectual Insight    Patient's impression of their current status: Patient reports that his \"mood has been relatively stable\". The patient reports that he \"hasn't really had any highs or any lows.\" The patient reports that he has been focusing on his health.     Therapist impression of patients current state: This 30 y.o. White or  male presents with  Adjustment disorder with mixed anxiety and depressed mood. The patient appeared more energetic this session, than he did in the previous session, as evident by her observable behavior and manner of speaking. The patient appeared less depressed this session than he did in the previous session, as evident by his overall demeanor and manner of speaking. The patient appears as though he is continuing to practice healthy coping " strategies, as evident by his statements and reported actions.       Assessment tools used today include: None      Type of psychotherapeutic technique provided: Insight oriented, Client centered and Active listening, Validation, Normalizaiton, CBT      Progress toward short term goals:Progress as expected, Patient reports that he has been practicing being open and honest about his feelings  and reports that he continues to exercise regularly.       Review of long term goals:  Not done at today's visit . Date of last review 8/28/2020      Diagnosis:   1. Adjustment disorder with mixed anxiety and depressed mood     improving      Plan and Follow-up: Patient plans to: play more music, switch up exercise routine, explore career advancement opportunities, and decrease caffeine intake. Patient plans to follow up with therapy 11/18/2020. Update treatment plan on or before 11/28/2020.        Discharge Criteria/Planning: Patient will continue with follow-up until therapy can be discontinued without return of signs and symptoms.      I have reviewed the note as documented above.  This accurately captures the substance of my conversation with the patient.    As the provider I attest to compliance with applicable laws and regulations related to telemedicine.  Performed and documented by CASI Ross   11/6/2020Performed and documented by 11/6/2020

## 2021-06-12 NOTE — PROGRESS NOTES
Mental Health Visit Note    Patient: Kin Hickey    : 1990 MRN: [id number]    10/8/2020    Start time: 3:36 PM    Stop Time: 4:24 PM  Session # 8    Session Type: Patient is presenting for an Individual session.    Kin Hickey is a 30 y.o. male is being seen today for    Chief Complaint   Patient presents with     MH Follow Up   .     Telemedicine Visit: The patient's condition can be safely assessed and treated via synchronous audio and visual telemedicine encounter.      Reason for Telemedicine Visit: Services only offered telehealth    Originating Site (Patient Location): Patient's home    Distant Site (Provider Location): Provider Remote Setting- Home Office    Consent:  The patient/guardian has verbally consented to: the potential risks and benefits of telemedicine (video visit) versus in person care; bill my insurance or make self-payment for services provided; and responsibility for payment of non-covered services.     Mode of Communication:  Video Conference via [employer]    As the provider I attest to compliance with applicable laws and regulations related to telemedicine.    Those present for this visit: Patient and Provider    Follow up in regards to ongoing symptom management of adjustment disorder related symptoms (depression and anxiety)    New symptoms or complaints: None    Functional Impairment:   Personal: 3  Family: 2  Social: 2  Work: 2    Clinical assessment of mental status:   Kin Hickey presented on time.   He was oriented x3, open and cooperative, and dressed appropriately for this session and weather. His memory was Normal cognitive functioning .  His speech was  Within normal.  Language was typical and appropriate.  Concentration and focus is Within normal. Psychosis is not noted or reported. He reports his mood is Congruent w/content of speech and disappointed and frustrated..  Affect is congruent with speech and is Congruent w/content of speech and disappointment and  "frusterated.  Fund of knowledge is adequate. Insight is adequate for therapy.    ASSESSMENT: Current Emotional / Mental Status (status of significant symptoms):                           Patient denies current or recent suicidal ideation or behaviors.              Patient denies current or recent homicidal ideation or behaviors.              Patient denies current or recent self injurious behavior or ideation.              Patient denies other safety concerns.                           Recommended that patient call 911 or go to the local ED should there be a change in any of these risk factors.                Attitude:                                   Cooperative  Interested Friendly Pleasant Attentive              Orientation:                               All: Person, Place, Time and Situation                Speech                          Rate / Production:       Normal/ Responsive Normal                           Volume:                       Normal               Mood:                                      Normal disappointed and frustrated              Thought Content:                    Clear               Thought Form:                        Coherent  Logical               Insight:                                     Good  and Intellectual Insight    Patient's impression of their current status: Patient reports that he is \"doing pretty well\". However, he reports that he had recently been feeling disappointed and frustrated. The patient reports that he feels proud of how he handled the situation.     Therapist impression of patients current state: This 30 y.o. White or  male presents with depression and anxiety related symptoms in response to an identifiable stressor. The patient appears to be actively practicing his emotional regulation and communication skills, as evident by his reported actions.     Assessment tools used today include: None      Type of psychotherapeutic technique provided: Insight " oriented, Client centered, CBT and Active Listening, Validation, Normalization, Psychoeducation      Progress toward short term goals:Progress as expected, patient reports that he has been practicing communicating his thoughts and emotions.       Review of long term goals: Not done at today's visit . Date of last review 8/28/2020.    Diagnosis:   1. Adjustment disorder with mixed anxiety and depressed mood    improving      Plan and Follow-up: Patient plans to continue practicing being open and honest about his feelings and trying to not dwell on what other people are thinking. Patient plans to continue taking the medication as prescribed. Patient plans to follow up with therapy in two weeks.       Discharge Criteria/Planning: Patient will continue with follow-up until therapy can be discontinued without return of signs and symptoms.      I have reviewed the note as documented above.  This accurately captures the substance of my conversation with the patient.    As the provider I attest to compliance with applicable laws and regulations related to telemedicine.  Performed and documented by CASI Ross   10/6/2020Performed and documented by 10/6/2020

## 2021-06-13 NOTE — PROGRESS NOTES
Mental Health Psychotherapy Note    Patient: Kin Hickey    : 1990 MRN: [id number]    2020    Start time: 1:37 PM    Stop Time: 2:28 PM   Session # 13    Completed 2 point verification; patient verbally provided full name and date of birth.      Session Type: Patient is presenting for an Individual session.    Kin Hickey is a 30 y.o. male is being seen today for    Chief Complaint   Patient presents with     MH Follow Up   .     Telemedicine Visit: The patient's condition can be safely assessed and treated via synchronous audio and visual telemedicine encounter.      Reason for Telemedicine Visit: Services only offered telehealth    Originating Site (Patient Location): Patient's home    Distant Site (Provider Location): Provider Remote Setting    Consent:  The patient/guardian has verbally consented to: the potential risks and benefits of telemedicine (video visit) versus in person care; bill my insurance or make self-payment for services provided; and responsibility for payment of non-covered services.     Mode of Communication:  Video Conference via [employer]    As the provider I attest to compliance with applicable laws and regulations related to telemedicine.    Those present for this visit: Patient and Provider    Follow up in regards to ongoing symptom management of: adjustment disorder, with mixed anxiety and depression    New symptoms or complaints: None    Functional Impairment:   Personal: 3  Family: 1  Social: 2  Work: 2    Clinical assessment of mental status:   Kin Hickey presented on time.   He was oriented x3, open and cooperative, and dressed appropriately for this session and weather. His memory was Normal cognitive functioning .  His speech was  Within normal.  Language was typical and appropriate.  Concentration and focus is Within normal. Psychosis is not noted or reported. He reports his mood is Congruent w/content of speech, Anxious and Depressed.  Affect is  "congruent with speech and is Congruent w/content of speech, Anxious and Depressed.  Fund of knowledge is adequate. Insight is adequate for therapy.    ASSESSMENT: Current Emotional / Mental Status (status of significant symptoms):                           Patient denies current or recent suicidal ideation or behaviors.              Patient denies current or recent homicidal ideation or behaviors.              Patient denies current or recent self injurious behavior or ideation.              Patient denies other safety concerns.                           Recommended that patient call 911 or go to the local ED should there be a change in any of these risk factors.                Attitude:                                   Cooperative  Interested Jose Luis              Orientation:                               All: Person, Place, Time and Situation                Speech                          Rate / Production:       Normal/ Responsive Normal                           Volume:                       Normal               Mood:                                      Anxious  Depressed  Normal              Thought Content:                    Clear               Thought Form:                        Coherent  Logical               Insight:                                     Good  and Intellectual Insight      Patient's impression of their current status: Patient reports that he has been \"doing pretty well\". The patient reports that he has been focused on eating healthier and exercising. The patient reports that he has been having more vivid dreams lately and reports that he is not sure why his dreams have been so vivid.     Therapist impression of patients current state: This 30 y.o. White or  male presents with depression related symptoms in response to an identifiable stressor.  The patient's impression of his current status appears accurate. The patient demonstrated future/goal orientated thinking throughout the " session. The patient appears as though he is continuing to practice/utilize healthy coping strategies, as evident by his statements and reported actions. The patient appears to be actively engaging in his hobbies and pursuing his interests, as evident by his statements and reported actions.The patient appears empowered and motivated to give back to the community, as evident by his statements and reported actions.     Assessment tools used today include: None     Type of psychotherapeutic technique provided: Insight oriented, Client centered and Active listening, Validating, Normalization      Progress toward short term goals:Progress as expected, Patient reports that he has been eating healthier. 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 The patient reports that he has been exercising and playing music.      Review of long term goals: Not done at today's visit . Date of last review/update: 11/30/2020.     Diagnosis:   1. 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 Patient plant to continue to work on developing his group. Patient plans to follow up with therapy 1/11/2020.     Discharge Criteria/Planning: Patient will continue with follow-up until therapy can be discontinued without return of signs and symptoms.      I have reviewed the note as documented above.  This accurately captures the substance of my conversation with the patient.    As the provider I attest to compliance with applicable laws and regulations related to telemedicine.  Performed and documented by  CASI Ross      12/21/2020

## 2021-06-13 NOTE — PROGRESS NOTES
"Outpatient Mental Health Treatment Plan     Name:  Kin Hickey  :  1990  MRN:  [id number]     Treatment Plan: 90 Day Treatment Plan Update  Date Treatment Plan Initiated:  2020  Treatment Plan: Updated Treatment Plan R  Intake/initial treatment plan date: 2020  Benefit and risks and alternatives have been discussed: Yes     Plan:                 ? Depression                 Goal:    Decrease average depression level from 4 to 1 or 2.              Strategies:       ?[x]? Decrease social isolation                                      [x]? Increase involvement in meaningful activities                                      ?[]? Discuss sleep hygiene                                      ?[x]? Explore thoughts and expectations about self and others                                      ?[x]? Process grief (loss of significant person, independence, role, etc.)                                      ?[x]? Assess for suicide risk                                      ?[]? Implement physical activity routine (with physician approval)                                      [x]? Consider introduction of bibliotherapy and/or videos                                      [x]? Continue compliance with medical treatment plan (or explore barriers)                                         ?    Degree to which this is a problem (1-4): 4  Degree to which goal is met (1-4): 1  Date of Review: Will be reviewed/updated in 90 days     ? Other:   Goal: Improve communication skills and interpersonal relationships.              Strategies:       ?[x]? Learn assertive/effective communication techniques                                      [x]? Learn about \"healthy\" vs. \"unhealthy\" relationships                                      ?[x]? Identify, establish and maintain healthy boundaries                                      ?[x]? Explore thoughts and expectations about self and others                            ?  Degree to which " this is a problem (1-4): 3  Degree to which goal is met (1-4): 2    Date of Review: Will be updated/reviewed in 90 days     Functional Impairment: 1=Not at all/Rarely  2=Some days  3=Most Days  4=Every Day   Personal: 3  Family: 1  Social: 3  Work: 2     Diagnosis:   1. Adjustment disorder with mixed anxiety and depressed mood            Strengths:  Patient is: intelligent, insightful, self-aware, motivated, creative, considerate and resourceful. Patient has a stable support system.   Limitations: Patient has a small support system and limited financial means.   Cultural Considerations: Patient reports that he has a direct communication style. Patient has eclectic spiritual beliefs and is from a multicultural background.   Family involvement No, Patient does not want to involve his family members at this time. However, the patient is willing to involve them in his care if necessary.      Anticipated intensity of services:  Every 1-2 weeks    Estimated duration of treatment:  24 sessions  Necessity for frequency: This frequency is needed to establish therapeutic goals and for continuity of care in order to monitor progress.   Necessity for treatment: To address cognitive, behavioral, and/or emotional barriers in order to work toward goals and to improve quality of life.   Is this treatment appropriate with minimal intrusion/restrictions: Yes     Persons responsible for this plan:  ? [x]? Patient       ? [x]? Provider     ? []? Other: __________________        Provider: Amelia Newby  Date:  11/30/2020

## 2021-06-13 NOTE — PROGRESS NOTES
Mental Health Psychotherapy Note    Patient: Kin Hickey    : 1990 MRN: [id number]    2020    Start time: 1:34 PM    Stop Time: 2:22 PM  Session # 12    Session Type: Patient is presenting for an Individual session.    Kin Hickey is a 30 y.o. male is being seen today for    Chief Complaint   Patient presents with     MH Follow Up   .     Telemedicine Visit: The patient's condition can be safely assessed and treated via synchronous audio and visual telemedicine encounter.      Reason for Telemedicine Visit: Services only offered telehealth    Originating Site (Patient Location): Patient's home    Distant Site (Provider Location): Provider Remote Setting- Home Office    Consent:  The patient/guardian has verbally consented to: the potential risks and benefits of telemedicine (video visit) versus in person care; bill my insurance or make self-payment for services provided; and responsibility for payment of non-covered services.     Mode of Communication:  Video Conference via [employer]    As the provider I attest to compliance with applicable laws and regulations related to telemedicine.    Those present for this visit: Patient and Provider    Follow up in regards to ongoing symptom management of depression and anxiety related symptoms in response to an identifiable stressor    New symptoms or complaints: None    Functional Impairment:   Personal: 4  Family: 2  Social: 2  Work: 2    Clinical assessment of mental status:   Kin Hickey presented on time.   He was oriented x3, open and cooperative, and dressed appropriately for this session and weather. His memory was Normal cognitive functioning .  His speech was  Within normal.  Language was typical and appropriate.  Concentration and focus is Within normal. Psychosis is not noted or reported. He reports his mood is Congruent w/content of speech, Anxious and Depressed.  Affect is congruent with speech and is Congruent w/content of speech, Anxious  "and Depressed.  Fund of knowledge is adequate. Insight is adequate for therapy.    ASSESSMENT: Current Emotional / Mental Status (status of significant symptoms):                          Patient denies current or recent suicidal ideation or behaviors.              Patient denies current or recent homicidal ideation or behaviors.              Patient denies current or recent self injurious behavior or ideation.              Patient denies other safety concerns.                        Recommended that patient call 911 or go to the local ED should there be a change in any of these risk factors.                Attitude:                                   Cooperative  Interested Attentive              Orientation:                               All: Person, Place, Time and Situation                Speech                          Rate / Production:       Normal/ Responsive Normal                           Volume:                       Normal               Mood:                                      Anxious  Depressed  Normal              Thought Content:                    Clear               Thought Form:                        Coherent  Goal Directed  Logical               Insight:                                     Good  and Intellectual Insight    Patient's impression of their current status: The patient reports that he is: \"Doing ok. I'm trying to keep my head straight.\" The patient reports that he has been focusing on: work exercise and music.     Therapist impression of patients current state: This 30 y.o. White or  male presents with depression related symptoms in response to an identifiable stressor.  The patient's impression of his current status appears accurate. The patient appears as though he is continuing to practice/utilize healthy coping strategies, as evident by his statements and reported actions. The patient appears to be actively engaging in his hobbies and pursuing his interests, as evident " by his statements and reported actions. The patient continues to appear motivated to make progress on his goals. The patient appears empowered and motivated to give back to the community, as evident by his statements and reported actions.     Assessment tools used today include: None      Type of psychotherapeutic technique provided: Insight oriented, Client centered, CBT and Active listening, Validation, Normaliztion      Progress toward short term goals:Progress as expected, The patient reports that he spoke to one of his support group leaders about starting his own group. The patient reports that he has been playing the guitar more. The patient reports that he continues to play the piano and study Mongolian. The patient also continues to exercise on a regular basis.     Review of long term goals: Not done at today's visit . Date of last review 11/30/2020.       Diagnosis:   1. Adjustment disorder with mixed anxiety and depressed mood    Some improvement      Plan and Follow-up:  Patient plans to: play more music and write more music Patient plans to continue exercising. Patient plans to follow up with therapy 12/14/2020.       Discharge Criteria/Planning: Patient will continue with follow-up until therapy can be discontinued without return of signs and symptoms.      I have reviewed the note as documented above.  This accurately captures the substance of my conversation with the patient.    As the provider I attest to compliance with applicable laws and regulations related to telemedicine.  Performed and documented by CASI Ross   12/7/2020Performed and documented by 12/7/2020

## 2021-06-13 NOTE — PROGRESS NOTES
Mental Health Psychotherapy Note    Patient: Kin Hickey    : 1990 MRN: [id number]    2020    Start time: 1:36 PM    Stop Time: 2:27 PM   Session # 11    Session Type: Patient is presenting for an Individual session.    Kin Hickey is a 30 y.o. male is being seen today for    Chief Complaint   Patient presents with     MH Follow Up     Depression   .     Telemedicine Visit: The patient's condition can be safely assessed and treated via synchronous audio and visual telemedicine encounter.      Reason for Telemedicine Visit: Services only offered telehealth    Originating Site (Patient Location): Patient's home    Distant Site (Provider Location): Provider Remote Setting- Home Office    Consent:  The patient/guardian has verbally consented to: the potential risks and benefits of telemedicine (video visit) versus in person care; bill my insurance or make self-payment for services provided; and responsibility for payment of non-covered services.     Mode of Communication:  Video Conference via [employer]    As the provider I attest to compliance with applicable laws and regulations related to telemedicine.    Those present for this visit: Patient and Provider    Follow up in regards to ongoing symptom management of depression:    New symptoms or complaints: None    Functional Impairment:   Personal: 3  Family: 1  Social: 2  Work: 2    Clinical assessment of mental status:   Kin Hickey presented on time.   He was oriented x3, open and cooperative, and dressed appropriately for this session and weather. His memory was Normal cognitive functioning .  His speech was  Within normal.  Language was typical and appropriate.  Concentration and focus is Within normal. Psychosis is not noted or reported. He reports his mood is Congruent w/content of speech and Depressed.  Affect is congruent with speech and is Congruent w/content of speech and Depressed.  Fund of knowledge is adequate. Insight is adequate  "for therapy.    ASSESSMENT: Current Emotional / Mental Status (status of significant symptoms):                            Patient denies current or recent suicidal ideation or behaviors.              Patient denies current or recent homicidal ideation or behaviors.              Patient denies current or recent self injurious behavior or ideation.              Patient denies other safety concerns.                           Recommended that patient call 911 or go to the local ED should there be a change in any of these risk factors.                Attitude:                                   Cooperative  Interested Friendly Pleasant Attentive              Orientation:                               All: Person, Place, Time and Situation                Speech                          Rate / Production:       Normal/ Responsive Normal                           Volume:                       Normal               Mood:                                      Depressed  Normal              Thought Content:                    Clear               Thought Form:                        Coherent  Goal Directed  Logical               Insight:                                     Good  and Intellectual Insight    Patient's impression of their current status: Patient reports that he has been \"feeling a little more lonely lately\" since his place of employment was forced to temporarily close due to the COVID numbers. The patient reports that he has \"been feeling worse lately\" but reports that he has been able to \"talk himself through it\". The patient reports that he is trying to \"stay positive and be productive\".    Therapist impression of patients current state: This 30 y.o. White or  male presents with depression related symptoms in response to an identifiable stressor.  The patient appeared more depressed this session than he did in the previous session, as evident by his: statements and demeanor. The patient appears as though " he is continuing to practice/utilize healthy coping strategies, as evident by his statements and reported actions. The patient appears to be actively engaging in his hobbies and pursuing his interests, as evident by his statements and reported actions. The patient continues to appear motivated to make progress on his goals.     Assessment tools used today include:  ARMANDO-7, PHQ-9, CGI and WHODAS  and can be found documented in Doc Flowsheets.       Type of psychotherapeutic technique provided: Insight oriented, Client centered and Active listening, Validation, Normalization      Progress toward short term goals:Progress as expected, The patient reports that he has been trying to stay productive. The patient reports that he has been playing th piano and learning a new language.        Review of long term goals: Long-term goals reviewed and Treatment Plan updated.      Diagnosis:   1. Adjustment disorder with mixed anxiety and depressed mood    Some improvement      Plan and Follow-up: Patient plans to not let the lock down get to him.     Discharge Criteria/Planning: Patient will continue with follow-up until therapy can be discontinued without return of signs and symptoms.      I have reviewed the note as documented above.  This accurately captures the substance of my conversation with the patient.    As the provider I attest to compliance with applicable laws and regulations related to telemedicine.  Performed and documented by CASI Ross   11/30/2020Performed and documented by 11/30/2020

## 2021-06-14 NOTE — PROGRESS NOTES
Mental Health Psychotherapy Note    Patient: Kin Hickey    : 1990 MRN: [id number]    2021    Start time: 1:34 PM    Stop Time: 2:23 PM  Session # 14    Completed 2 point verification; patient verbally provided full name and date of birth.      Session Type: Patient is presenting for an Individual session.    Kin Hickey is a 31 y.o. male is being seen today for    Chief Complaint   Patient presents with     MH Follow Up     Depression   .     Telemedicine Visit: The patient's condition can be safely assessed and treated via synchronous audio and visual telemedicine encounter.      Reason for Telemedicine Visit: Services only offered telehealth    Originating Site (Patient Location): Patient's home    Distant Site (Provider Location): Provider Remote Setting    Consent:  The patient/guardian has verbally consented to: the potential risks and benefits of telemedicine (video visit) versus in person care; bill my insurance or make self-payment for services provided; and responsibility for payment of non-covered services.     Mode of Communication:  Video Conference via [employer]    As the provider I attest to compliance with applicable laws and regulations related to telemedicine.    Those present for this visit: Patient and Provider    Follow up in regards to ongoing symptom management of: adjustment disorder; With mixed anxiety and depressed mood    New symptoms or complaints: None    Functional Impairment:   Personal: 3  Family: 1  Social: 2  Work: 2    Clinical assessment of mental status:   Kin Hickey presented on time.   He was oriented x3, open and cooperative, and dressed appropriately for this session and weather. His memory was Normal cognitive functioning .  His speech was  Within normal.  Language was typical and appropriate.  Concentration and focus is Within normal. Psychosis is not noted or reported. He reports his mood is Congruent w/content of speech, Anxious and  "Depressed.  Affect is congruent with speech and is Congruent w/content of speech, Anxious and Depressed.  Fund of knowledge is adequate. Insight is adequate for therapy.    ASSESSMENT: Current Emotional / Mental Status (status of significant symptoms):              Risk status (Self / Other harm or suicidal ideation)              Patient denies current or recent suicidal ideation or behaviors.              Patient denies current or recent homicidal ideation or behaviors.              Patient denies current or recent self injurious behavior or ideation.              Patient denies other safety concerns.                            Recommended that patient call 911 or go to the local ED should there be a change in any of these risk factors.                Attitude:                                   Cooperative  Interested Friendly Pleasant Attentive              Orientation:                               All: Person, Place, Time and Situation                Speech                          Rate / Production:       Normal/ Responsive Normal                           Volume:                       Normal               Mood:                                      Anxious  Depressed  Normal Disappointed              Thought Content:                    Clear               Thought Form:                        Coherent  Goal Directed  Logical               Insight:                                     Good  and Intellectual Insight      Patient's impression of their current status: The patient reports that \"it has been a rough couple of weeks\". Patient reports that he has had trouble sleeping since Wednesday. The patient reports that he has been \"feeling more anxious about the future\". The patient reports feeling frustrated and disappointed about the outcome of a recent relationship.    Therapist impression of patients current state: This 31 y.o. White or  male presents with depression and anxiety related symptoms in " response to an identifiable stressor.  The patient's impression of his current status appears accurate. The patient demonstrated future/goal orientated thinking throughout the session. The patient appears as though he is continuing utilize healthy coping strategies, as evident by his statements and reported actions. The patient appears to be actively engaging in his hobbies and pursuing his interests, as evident by his statements and reported actions.The patient continues to appear empowered and motivated to give back to the community, as evident by his statements and reported actions. Patient processed through his thoughts and emotions related to recent political events and the outcome of his most recent relationship.     Assessment tools used today include: None    Type of psychotherapeutic technique provided: Insight oriented, Client centered, CBT and Active listening, Validation, Normalization      Progress toward short term goals: Progress as expected, Patient reports that he has been eating healthy.  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 The patient reports that he has not been feeling as musically inspired and reports that he has not been playing as much music lately. The patient reports that he continues to exercise on a regular basis.       Review of long term goals:   Not done at today's visit . Date of last review/update: 11/30/2020.          Diagnosis:   1. Adjustment disorder with mixed anxiety and depressed mood     improving      Plan and Follow-up: Patient plans to meditate. Patient plans to continue exercising and eating healthy. Patient plant to continue to work on developing his group. Patient plans to follow up with therapy 1/19/2021    Discharge Criteria/Planning: Patient will continue with follow-up until therapy can be discontinued without return of signs and symptoms.      I have reviewed the note as documented above.  This accurately  captures the substance of my conversation with the patient.    As the provider I attest to compliance with applicable laws and regulations related to telemedicine.  Performed and documented by   Amelia Newby, LICSW   1/11/2021

## 2021-06-15 NOTE — PROGRESS NOTES
Mental Health Psychotherapy Note    Patient: Kin Hickey    : 1990 MRN: [id number]    2021    Start time: 2:41 PM    Stop Time: 3:32 PM   Session # 16    Completed 2 point verification; patient verbally provided full name and date of birth.      Session Type: Patient is presenting for an Individual session.    Kin Hickey is a 31 y.o. male is being seen today for    Chief Complaint   Patient presents with     MH Follow Up     Depression     Anxiety   .     Telemedicine Visit: The patient's condition can be safely assessed and treated via synchronous audio and visual telemedicine encounter.      Reason for Telemedicine Visit: Services only offered telehealth    Originating Site (Patient Location): Patient's home    Distant Site (Provider Location): Provider Remote Setting    Consent:  The patient/guardian has verbally consented to: the potential risks and benefits of telemedicine (video visit) versus in person care; bill my insurance or make self-payment for services provided; and responsibility for payment of non-covered services.     Mode of Communication:  Video Conference via [employer]    As the provider I attest to compliance with applicable laws and regulations related to telemedicine.    Those present for this visit: Patient and Provider    Follow up in regards to ongoing symptom management of depression and anxiety    New symptoms or complaints: None    Functional Impairment:   Personal: 3  Family: 1  Social: 2  Work: 2    Clinical assessment of mental status:   Kin Hickey presented on time.   He was oriented x3, open and cooperative, and dressed appropriately for this session and weather. His memory was Normal cognitive functioning .  His speech was  Within normal.  Language was typical and appropriate.  Concentration and focus is Within normal. Psychosis is not noted or reported. He reports his mood is Congruent w/content of speech, Anxious and Depressed.  Affect is congruent  "with speech and is Congruent w/content of speech and Depressed.  Fund of knowledge is adequate. Insight is adequate for therapy.    ASSESSMENT: Current Emotional / Mental Status (status of significant symptoms):              Risk status (Self / Other harm or suicidal ideation)              Patient denies personal safety concerns.              Patient denies current or recent suicidal ideation or behaviors.              Patient denies current or recent homicidal ideation or behaviors.              Patient denies current or recent self injurious behavior or ideation.              Patient denies other safety concerns.                            Recommended that patient call 911 or go to the local ED should there be a change in any of these risk factors.                Attitude:                                   Cooperative  Interested Friendly Pleasant Attentive              Orientation:                               All: Person, Place, Time and Situation                Speech                          Rate / Production:       Normal/ Responsive Normal                           Volume:                       Normal               Mood:                                      Depressed  Normal              Thought Content:                    Clear               Thought Form:                        Coherent  Logical               Insight:                                     Good  and Intellectual Insight      Patient's impression of their current status: The patient reports that \"things are going well\" for him at work and 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 The patient reports that he feels valued at work. He patient reports that he has been trying to focus on the areas of his life that are going well. 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     Therapist impression of patients current state: This 31 y.o. " "White or  male presents with depression, anxiety, and trauma related symptoms in response to an identifiable stressor.  The patient's impression of his current status appears accurate. The patient demonstrated future/goal orientated thinking throughout the session. The patient appears as though he is continuing utilize healthy coping strategies, as evident by his statements and reported actions. The patient appears motivated to \"begin the next chapter\" of his life, as evident by his statements and reported actions. Patient and provider discussed and processed through the patient's current life stressor and changes at work.       Assessment tools used today include: None    Type of psychotherapeutic technique provided: Insight oriented, Client centered and Active listening, Validation, Normalization      Progress toward short term goals:Progress as expected, Patient reports that he practiced mindful breathing. The patient reports that he finished half a book. The patient reports that he has been exercising.        Review of long term goals: Not done at today's visit . Date of last review 1/30/2020.    Diagnosis:   1. Adjustment disorder with mixed anxiety and depressed mood     improving      Plan and Follow-up: Patient plans to start looking for apartments. Patient plans on reading another book. Patient plans to practice mindful breathing. Patient plans to continue exercising and eating smaller portion sizes. Update treatment plan next session (CGI, WHODAS, PHQ-9 and ARMANDO-7). Patient plans to follow up with therapy 03/10/2021      Discharge Criteria/Planning: Patient will continue with follow-up until therapy can be discontinued without return of signs and symptoms.      I have reviewed the note as documented above.  This accurately captures the substance of my conversation with the patient.    As the provider I attest to compliance with applicable laws and regulations related to telemedicine.  Performed and " documented by  Amelia Newby, [facility]      2/23/2021

## 2021-06-15 NOTE — PROGRESS NOTES
Mental Health Psychotherapy Note    Patient: Kin Hickey    : 1990 MRN: [id number]    2/3/2021    Start time: 3:37 PM    Stop Time: 4:31 PM   Session # 15    Session was 53+ min long due to content of session and length of time between sessions.     Completed 2 point verification; patient verbally provided full name and date of birth.      Session Type: Patient is presenting for an Individual session.    Kin Hickey is a 31 y.o. male is being seen today for    Chief Complaint   Patient presents with     MH Follow Up     Depression   .     Telemedicine Visit: The patient's condition can be safely assessed and treated via synchronous audio and visual telemedicine encounter.      Reason for Telemedicine Visit: Services only offered telehealth    Originating Site (Patient Location): Patient's home    Distant Site (Provider Location): Provider Remote Setting    Consent:  The patient/guardian has verbally consented to: the potential risks and benefits of telemedicine (video visit) versus in person care; bill my insurance or make self-payment for services provided; and responsibility for payment of non-covered services.     Mode of Communication:  Video Conference via [employer]    As the provider I attest to compliance with applicable laws and regulations related to telemedicine.    Those present for this visit: Patient and Provider    Follow up in regards to ongoing symptom management of: adjustment disorder; With mixed anxiety and depressed mood    New symptoms or complaints: None    Functional Impairment:   Personal: 4  Family: 1  Social: 2  Work: 2    Clinical assessment of mental status:   Kin Hickey presented on time.   He was oriented x3, open and cooperative, and dressed appropriately for this session and weather. His memory was Normal cognitive functioning .  His speech was  Within normal.  Language was typical and appropriate.  Concentration and focus is Within normal. Psychosis is not  "noted or reported. He reports his mood is Congruent w/content of speech, Anxious and Depressed.  Affect is congruent with speech and is Congruent w/content of speech, Anxious and Depressed.  Fund of knowledge is adequate. Insight is adequate for therapy.    ASSESSMENT: Current Emotional / Mental Status (status of significant symptoms):              Risk status (Self / Other harm or suicidal ideation)              Patient denies personal safety concerns.              Patient denies current or recent suicidal ideation or behaviors.              Patient denies current or recent homicidal ideation or behaviors.              Patient denies current or recent self injurious behavior or ideation.              Patient denies other safety concerns.                            Recommended that patient call 911 or go to the local ED should there be a change in any of these risk factors.                Attitude:                                   Cooperative  Interested Friendly Pleasant Attentive              Orientation:                               All: Person, Place, Time and Situation                Speech                          Rate / Production:       Normal/ Responsive Normal                           Volume:                       Normal               Mood:                                      Anxious  Depressed  Normal              Thought Content:                    Clear               Thought Form:                        Coherent  Goal Directed  Logical               Insight:                                     Good  and Intellectual Insight      Patient's impression of their current status: The patient reports that he \"is doing ok\" and states: \"It's another COVD day\". The patient reports that he has been \"up and down\". The patient reports that reading the book \"The Body Keeps the Score\" has been helpful for him to understand how trauma has impacted him. The patient reports that he has been reading more and reports " that reading has been beneficial for him.     Therapist impression of patients current state: This 31 y.o. White or  male presents with depression, anxiety, and trauma related symptoms in response to an identifiable stressor.  The patient's impression of his current status appears accurate. The patient demonstrated future/goal orientated thinking throughout the session. The patient appears as though he is continuing utilize healthy coping strategies, as evident by his statements and reported actions. The patient continues to appear empowered and motivated to give back to the community, as evident by his statements and reported actions. Patient processed through his thoughts and emotions related to a recent social trauma and discussed his reaction to a book he is reading.     Assessment tools used today include: None    Type of psychotherapeutic technique provided: Insight oriented, Client centered and Active listening, Validation, Normalization, Supportive counseling      Progress toward short term goals: Progress as expected, Patient reports that he has been eating healthy.  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 The patient reports that he has not been meditating. Patient reports that he has not been feeling as musically inspired and reports that he has not been playing as much music lately. The patient reports that he continues to exercise on a regular basis      Review of long term goals: Not done at today's visit . Date of last review/update: 11/30/2020.     Diagnosis:   1. Adjustment disorder with mixed anxiety and depressed mood     improving      Plan and Follow-up: Patient plans on reading another book. Patient plans to practice mindful breathing. Patient plans to continue exercising and eating healthy. Patient plant to continue to work on developing his group. Patient plans to follow up with therapy 02/23/2021    Discharge Criteria/Planning:  Patient will continue with follow-up until therapy can be discontinued without return of signs and symptoms.      I have reviewed the note as documented above.  This accurately captures the substance of my conversation with the patient.    As the provider I attest to compliance with applicable laws and regulations related to telemedicine.  Performed and documented by  CASI Ross      2/3/2021

## 2021-06-16 PROBLEM — F43.10 POSTTRAUMATIC STRESS DISORDER: Status: ACTIVE | Noted: 2020-06-29

## 2021-06-16 PROBLEM — R45.851 SUICIDAL IDEATION: Status: ACTIVE | Noted: 2020-06-27

## 2021-06-16 PROBLEM — F43.23 ADJUSTMENT DISORDER WITH MIXED ANXIETY AND DEPRESSED MOOD: Status: ACTIVE | Noted: 2020-06-30

## 2021-06-16 PROBLEM — F33.2 SEVERE RECURRENT MAJOR DEPRESSION WITHOUT PSYCHOTIC FEATURES (H): Status: ACTIVE | Noted: 2020-06-28

## 2021-08-07 ENCOUNTER — HEALTH MAINTENANCE LETTER (OUTPATIENT)
Age: 31
End: 2021-08-07

## 2021-08-30 ENCOUNTER — VIRTUAL VISIT (OUTPATIENT)
Dept: BEHAVIORAL HEALTH | Facility: CLINIC | Age: 31
End: 2021-08-30
Payer: COMMERCIAL

## 2021-08-30 DIAGNOSIS — F43.23 ADJUSTMENT DISORDER WITH MIXED ANXIETY AND DEPRESSED MOOD: Primary | ICD-10-CM

## 2021-08-30 PROCEDURE — 90837 PSYTX W PT 60 MINUTES: CPT | Mod: 95 | Performed by: SOCIAL WORKER

## 2021-08-30 NOTE — PROGRESS NOTES
Progress Note    Patient Name: Kin Hickey  Date: 8/30/2021         Service Type: Individual      Session Start Time: 11:07 AM   Session End Time: 12:11 PM     Session Length: 64 min (Session was 53+ min in length due to length of time between sessions, content of session and scheduling).     Session #: 17    Attendees: Client attended alone    Service Modality:  Video Visit:      Provider verified identity through the following two step process.  Patient provided:  Patient is known previously to provider    Telemedicine Visit: The patient's condition can be safely assessed and treated via synchronous audio and visual telemedicine encounter.      Reason for Telemedicine Visit: Services only offered telehealth    Originating Site (Patient Location): Patient's home    Distant Site (Provider Location): Provider Remote Setting- Home Office    Consent:  The patient/guardian has verbally consented to: the potential risks and benefits of telemedicine (video visit) versus in person care; bill my insurance or make self-payment for services provided; and responsibility for payment of non-covered services.     Patient would like the video invitation sent by:  Text to cell phone: [phone]    Mode of Communication:  [facility] Conference [facility]    As the provider I attest to compliance with applicable laws and regulations related to telemedicine.     Treatment Plan Last Reviewed: 11/30/2021 due to gap in services. Provider and patient have not met since February of 2021.   PHQ-9 / ARMANDO-7 : 5/0    DATA  Interactive Complexity: No  Crisis: No       Progress Since Last Session (Related to Symptoms / Goals / Homework):   Symptoms: Improving , Patient reports that overall things have been a lot better.     Homework: N/A - due to gap in services.       Episode of Care Goals: Satisfactory progress - ACTION (Actively working towards change); Intervened by reinforcing change plan /  "affirming steps taken     Current / Ongoing Stressors and Concerns:  Patient presents with: depression, anxiety, and trauma related symptoms in response to an identifiable stressor. The patient reports that \"things are better overall.\"  The patient's impression of his current status appears accurate.The patient appears as though he is continuing utilize healthy coping strategies, as evident by his statements and reported actions. Patient and provider processed through his thoughts and emotions related to: work, the group he started, his recent community outing, and his progress.  The patient appears emotionally stable as evident by his: statements, reported actions and thought content. The patient appears focused on his own healing journey.        Treatment Objective(s) Addressed in This Session:                ?[x]? Decrease social isolation               [x]? Increase involvement in meaningful activities               ?[x]? Explore thoughts and expectations about self and others     Intervention:   Insight oriented, Strengths based, Psychodynamic, Client centered, Active listening, Validation, Normalization        ASSESSMENT: Current Emotional / Mental Status (status of significant symptoms):   Risk status (Self / Other harm or suicidal ideation)   Patient denies current fears or concerns for personal safety.   Patient denies current or recent suicidal ideation or behaviors.   Patient denies current or recent homicidal ideation or behaviors.   Patient denies current or recent self injurious behavior or ideation.   Patient denies other safety concerns.   Patient reports there has been no change in risk factors since their last session.     Patient reports there has been no change in protective factors since their last session.     Recommended that patient call 911 or go to the local ED should there be a change in any of these risk factors.     Appearance:   Appropriate    Eye Contact:   Good    Psychomotor " Behavior: Normal    Attitude:   Cooperative  Interested Friendly Pleasant Attentive   Orientation:   Person Place Time Situation All   Speech    Rate / Production: Normal/ Responsive Normal     Volume:  Normal    Mood:    Normal   Affect:    Appropriate  Blunted    Thought Content:  Clear    Thought Form:  Coherent  Goal Directed  Logical    Insight:    Good  and Intellectual Insight     Medication Review:   No current psychiatric medications prescribed     Medication Compliance:   NA     Changes in Health Issues:   None reported     Chemical Use Review:   Substance Use: Chemical use reviewed, no active concerns identified      Tobacco Use: No current tobacco use.      Diagnosis:  No diagnosis found.    Collateral Reports Completed:   Not Applicable    PLAN: (Patient Tasks / Therapist Tasks / Other)  Patient plans to continue to exercise regularly. 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 Patient plans to think of treatment goals he would like to address in therapy. Patient plans to return for a follow-up session in 2 weeks.     Provider and patient will update patient's treatment plan/will establish a [facility] treatment plan next session (Update: PHQ-9, ARMANDO-7, C-SSRS (Short), CGI, and WHODAS)        Amelia Newby, LICSW                                                         ______________________________________________________________________

## 2021-10-02 ENCOUNTER — HEALTH MAINTENANCE LETTER (OUTPATIENT)
Age: 31
End: 2021-10-02

## 2022-09-03 ENCOUNTER — HEALTH MAINTENANCE LETTER (OUTPATIENT)
Age: 32
End: 2022-09-03

## 2023-09-30 ENCOUNTER — HEALTH MAINTENANCE LETTER (OUTPATIENT)
Age: 33
End: 2023-09-30

## 2024-05-24 NOTE — PROGRESS NOTES
Mental Health Visit Note    Patient: Kin Hickey    : 1990 MRN: [id number]    2020    Start time:1:33 PM   Stop Time: 2:   Session # 7    Session Type: Patient is presenting for an Individual session.    Kin Hickey is a 30 y.o. male is being seen today for: Adjustment disorder with mixed anxiety and depressed mood       Telemedicine Visit: The patient's condition can be safely assessed and treated via synchronous audio and visual telemedicine encounter.      Reason for Telemedicine Visit: Services only offered telehealth    Originating Site (Patient Location): Patient's home    Distant Site (Provider Location): Provider Remote Setting- Home Office    Consent:  The patient/guardian has verbally consented to: the potential risks and benefits of telemedicine (video visit) versus in person care; bill my insurance or make self-payment for services provided; and responsibility for payment of non-covered services.     Mode of Communication:  Video Conference via [employer]    As the provider I attest to compliance with applicable laws and regulations related to telemedicine.    Those present for this visit: Provider and Patient    Follow up in regards to ongoing symptom management of: depression and anxiety related symptoms in response to an identifiable stressor    New symptoms or complaints: None    Functional Impairment:   Personal: 3  Family: 2  Social: 2  Work: 2    Clinical assessment of mental status:   Kin Hickey presented on time.   He was oriented x3, open and cooperative, and dressed appropriately for this session and weather. His memory was Normal cognitive functioning .  His speech was  Within normal.  Language was typical and appropriate.  Concentration and focus is Within normal. Psychosis is not noted or reported. He reports his mood is happy.  Affect is congruent with speech and is Congruent w/content of speech and stoic.  Fund of knowledge is adequate. Insight is adequate for  "therapy.    ASSESSMENT: Current Emotional / Mental Status (status of significant symptoms):                            Patient denies current or recent suicidal ideation or behaviors.              Patient denies current or recent homicidal ideation or behaviors.              Patient denies current or recent self injurious behavior or ideation.              Patient denies other safety concerns.                      Recommended that patient call 911 or go to the local ED should there be a change in any of these risk factors.                Attitude:                                   Cooperative  Interested Friendly Pleasant Attentive              Orientation:                               All: Person, Place, Time and Situation                Speech                          Rate / Production:       Normal/ Responsive Normal                           Volume:                       Normal               Mood:                                      Anxious  Normal happy              Thought Content:                    Clear               Thought Form:                        Coherent  Logical               Insight:                                     Good  and Intellectual Insight    Patient's impression of their current status: Patient reports that this is the happiest he has been in awhile. The patient reports that he is being mindful that he is entering into a relationship with another person \"for the right reasons\".    Therapist impression of patients current state: This 30 y.o. White or  male presents with depression and anxiety related symptoms in response to an identifiable stressor. The patient appeared emotionally stable throughout the session, as evident by his: statements, observable behavior, and thought processes. The patient appears aware of his: motivations, emotions, and reasons for his actions, as evident by his statements and reported actions. The patient appears ready to enter into a \"healthy " "relationship\" with another person, as evident by his statements and reported actions.     Assessment tools used today include: Will complete the PHQ-9 and ARMANDO-7 next session.     Type of psychotherapeutic technique provided: Insight oriented, Client centered and Active Listening, Validation, Nornalization, Psychoeducation      Progress toward short term goals:Progress as expected, patient reports that he has yet to perform his the release/forgiveness activity that he planned. The patient reports that he has been working out, eating healthy and spending time with other people.       Review of long term goals: Not done at today's visit . Date of last review 8/28/2020.       Diagnosis: adjustment disorder, with mixed anxiety and depressed mood improving      Plan and Follow-up: Patient plans to perform his releasing/forgiveness action. Patient plans to continue: exercising, eating healthy goods, and taking the medication as prescribed. Patient plans to follow up with therapy in two weeks.       Discharge Criteria/Planning: Patient will continue with follow-up until therapy can be discontinued without return of signs and symptoms.      I have reviewed the note as documented above.  This accurately captures the substance of my conversation with the patient.    As the provider I attest to compliance with applicable laws and regulations related to telemedicine.  Performed and documented by CASI Ross   9/25/2020Performed and documented by 9/25/2020      " Consistent Carbohydrate Diabetic Diets